# Patient Record
Sex: MALE | Race: WHITE | Employment: OTHER | URBAN - METROPOLITAN AREA
[De-identification: names, ages, dates, MRNs, and addresses within clinical notes are randomized per-mention and may not be internally consistent; named-entity substitution may affect disease eponyms.]

---

## 2019-06-15 ENCOUNTER — APPOINTMENT (OUTPATIENT)
Dept: CT IMAGING | Age: 69
DRG: 199 | End: 2019-06-15
Payer: OTHER MISCELLANEOUS

## 2019-06-15 ENCOUNTER — HOSPITAL ENCOUNTER (INPATIENT)
Age: 69
LOS: 5 days | Discharge: HOME HEALTH | DRG: 199 | End: 2019-06-20
Attending: EMERGENCY MEDICINE | Admitting: SURGERY
Payer: OTHER MISCELLANEOUS

## 2019-06-15 ENCOUNTER — APPOINTMENT (OUTPATIENT)
Dept: GENERAL RADIOLOGY | Age: 69
DRG: 199 | End: 2019-06-15
Payer: OTHER MISCELLANEOUS

## 2019-06-15 DIAGNOSIS — S22.22XA CLOSED FRACTURE OF BODY OF STERNUM, INITIAL ENCOUNTER: ICD-10-CM
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
## 2019-06-15 DIAGNOSIS — S22.5XXA CLOSED FRACTURE OF MULTIPLE RIBS WITH FLAIL CHEST, INITIAL ENCOUNTER: ICD-10-CM
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
## 2019-06-15 LAB
ABO/RH: NORMAL
ACT TEG: 105 SEC (ref 86–118)
ALLEN TEST: ABNORMAL
ANGLE, RAPID TEG: 78.9 DEG (ref 64–80)
ANION GAP SERPL CALCULATED.3IONS-SCNC: 12 MMOL/L (ref 9–17)
ANTIBODY SCREEN: NEGATIVE
ARM BAND NUMBER: NORMAL
BLOOD BANK SPECIMEN: ABNORMAL
BUN BLDV-MCNC: 18 MG/DL (ref 8–23)
CARBOXYHEMOGLOBIN: 0.9 % (ref 0–5)
CHLORIDE BLD-SCNC: 100 MMOL/L (ref 98–107)
CO2: 22 MMOL/L (ref 20–31)
CREAT SERPL-MCNC: 0.9 MG/DL (ref 0.7–1.2)
EPL-TEG: 0.6 % (ref 0–15)
ETHANOL PERCENT: <0.01 %
ETHANOL: <10 MG/DL
EXPIRATION DATE: NORMAL
FIO2: ABNORMAL
GFR AFRICAN AMERICAN: ABNORMAL ML/MIN
GFR NON-AFRICAN AMERICAN: ABNORMAL ML/MIN
GFR SERPL CREATININE-BSD FRML MDRD: ABNORMAL ML/MIN/{1.73_M2}
GFR SERPL CREATININE-BSD FRML MDRD: ABNORMAL ML/MIN/{1.73_M2}
GLUCOSE BLD-MCNC: 200 MG/DL (ref 75–110)
GLUCOSE BLD-MCNC: 236 MG/DL (ref 70–99)
HCG QUALITATIVE: NEGATIVE
HCO3 VENOUS: 24 MMOL/L (ref 24–30)
HCT VFR BLD CALC: 44.5 % (ref 40.7–50.3)
HEMOGLOBIN: 14.8 G/DL (ref 13–17)
HEPARIN THERAPY: ABNORMAL
INR BLD: 1
KINETICS RAPID TEG: 0.8 MIN (ref 1–2)
LY30 (LYSIS) TEG: 0.6 % (ref 0–8)
MA(MAX CLOT) RAPID TEG: 74.1 MM (ref 52–71)
MCH RBC QN AUTO: 29.5 PG (ref 25.2–33.5)
MCHC RBC AUTO-ENTMCNC: 33.3 G/DL (ref 28.4–34.8)
MCV RBC AUTO: 88.6 FL (ref 82.6–102.9)
METHEMOGLOBIN: ABNORMAL % (ref 0–1.5)
MODE: ABNORMAL
NEGATIVE BASE EXCESS, VEN: 1.3 MMOL/L (ref 0–2)
NOTIFICATION TIME: ABNORMAL
NOTIFICATION: ABNORMAL
NRBC AUTOMATED: 0 PER 100 WBC
O2 DEVICE/FLOW/%: ABNORMAL
O2 SAT, VEN: 68.8 % (ref 60–85)
OXYHEMOGLOBIN: ABNORMAL % (ref 95–98)
PARTIAL THROMBOPLASTIN TIME: 21.9 SEC (ref 20.5–30.5)
PATIENT TEMP: 37
PCO2, VEN, TEMP ADJ: ABNORMAL MMHG (ref 39–55)
PCO2, VEN: 44.8 (ref 39–55)
PDW BLD-RTO: 13 % (ref 11.8–14.4)
PEEP/CPAP: ABNORMAL
PH VENOUS: 7.35 (ref 7.32–7.42)
PH, VEN, TEMP ADJ: ABNORMAL (ref 7.32–7.42)
PLATELET # BLD: 374 K/UL (ref 138–453)
PMV BLD AUTO: 11.4 FL (ref 8.1–13.5)
PO2, VEN, TEMP ADJ: ABNORMAL MMHG (ref 30–50)
PO2, VEN: 39 (ref 30–50)
POSITIVE BASE EXCESS, VEN: ABNORMAL MMOL/L (ref 0–2)
POTASSIUM SERPL-SCNC: 4.3 MMOL/L (ref 3.7–5.3)
PROTHROMBIN TIME: 10.7 SEC (ref 9–12)
PSV: ABNORMAL
PT. POSITION: ABNORMAL
RBC # BLD: 5.02 M/UL (ref 4.21–5.77)
REACTION TIME RAPID TEG: 0.6 MIN (ref 0–1)
RESPIRATORY RATE: ABNORMAL
SAMPLE SITE: ABNORMAL
SET RATE: ABNORMAL
SODIUM BLD-SCNC: 134 MMOL/L (ref 135–144)
TEG COMMENT: ABNORMAL
TEXT FOR RESPIRATORY: ABNORMAL
TOTAL HB: ABNORMAL G/DL (ref 12–16)
TOTAL RATE: ABNORMAL
VT: ABNORMAL
WBC # BLD: 14.4 K/UL (ref 3.5–11.3)

## 2019-06-15 PROCEDURE — G0480 DRUG TEST DEF 1-7 CLASSES: HCPCS

## 2019-06-15 PROCEDURE — 85027 COMPLETE CBC AUTOMATED: CPT

## 2019-06-15 PROCEDURE — 85210 CLOT FACTOR II PROTHROM SPEC: CPT

## 2019-06-15 PROCEDURE — 2580000003 HC RX 258: Performed by: STUDENT IN AN ORGANIZED HEALTH CARE EDUCATION/TRAINING PROGRAM

## 2019-06-15 PROCEDURE — 82947 ASSAY GLUCOSE BLOOD QUANT: CPT

## 2019-06-15 PROCEDURE — 6360000004 HC RX CONTRAST MEDICATION: Performed by: EMERGENCY MEDICINE

## 2019-06-15 PROCEDURE — 86900 BLOOD TYPING SEROLOGIC ABO: CPT

## 2019-06-15 PROCEDURE — 2700000000 HC OXYGEN THERAPY PER DAY

## 2019-06-15 PROCEDURE — 82805 BLOOD GASES W/O2 SATURATION: CPT

## 2019-06-15 PROCEDURE — 74177 CT ABD & PELVIS W/CONTRAST: CPT

## 2019-06-15 PROCEDURE — 85390 FIBRINOLYSINS SCREEN I&R: CPT

## 2019-06-15 PROCEDURE — 84703 CHORIONIC GONADOTROPIN ASSAY: CPT

## 2019-06-15 PROCEDURE — 2000000000 HC ICU R&B

## 2019-06-15 PROCEDURE — 87641 MR-STAPH DNA AMP PROBE: CPT

## 2019-06-15 PROCEDURE — 85730 THROMBOPLASTIN TIME PARTIAL: CPT

## 2019-06-15 PROCEDURE — 6370000000 HC RX 637 (ALT 250 FOR IP): Performed by: STUDENT IN AN ORGANIZED HEALTH CARE EDUCATION/TRAINING PROGRAM

## 2019-06-15 PROCEDURE — 85610 PROTHROMBIN TIME: CPT

## 2019-06-15 PROCEDURE — 86901 BLOOD TYPING SEROLOGIC RH(D): CPT

## 2019-06-15 PROCEDURE — 71045 X-RAY EXAM CHEST 1 VIEW: CPT

## 2019-06-15 PROCEDURE — 70450 CT HEAD/BRAIN W/O DYE: CPT

## 2019-06-15 PROCEDURE — 72125 CT NECK SPINE W/O DYE: CPT

## 2019-06-15 PROCEDURE — 84520 ASSAY OF UREA NITROGEN: CPT

## 2019-06-15 PROCEDURE — 86850 RBC ANTIBODY SCREEN: CPT

## 2019-06-15 PROCEDURE — 99285 EMERGENCY DEPT VISIT HI MDM: CPT

## 2019-06-15 PROCEDURE — 72128 CT CHEST SPINE W/O DYE: CPT

## 2019-06-15 PROCEDURE — 82565 ASSAY OF CREATININE: CPT

## 2019-06-15 PROCEDURE — 72131 CT LUMBAR SPINE W/O DYE: CPT

## 2019-06-15 PROCEDURE — 80051 ELECTROLYTE PANEL: CPT

## 2019-06-15 RX ORDER — OXYCODONE HYDROCHLORIDE 5 MG/1
5 TABLET ORAL EVERY 4 HOURS PRN
Status: DISCONTINUED | OUTPATIENT
Start: 2019-06-15 | End: 2019-06-20 | Stop reason: HOSPADM

## 2019-06-15 RX ORDER — GABAPENTIN 600 MG/1
300 TABLET ORAL EVERY 8 HOURS
Status: DISCONTINUED | OUTPATIENT
Start: 2019-06-15 | End: 2019-06-19

## 2019-06-15 RX ORDER — DEXTROSE MONOHYDRATE 50 MG/ML
100 INJECTION, SOLUTION INTRAVENOUS PRN
Status: DISCONTINUED | OUTPATIENT
Start: 2019-06-15 | End: 2019-06-20 | Stop reason: HOSPADM

## 2019-06-15 RX ORDER — ONDANSETRON 2 MG/ML
4 INJECTION INTRAMUSCULAR; INTRAVENOUS EVERY 6 HOURS PRN
Status: DISCONTINUED | OUTPATIENT
Start: 2019-06-15 | End: 2019-06-20 | Stop reason: HOSPADM

## 2019-06-15 RX ORDER — NALOXONE HYDROCHLORIDE 0.4 MG/ML
0.4 INJECTION, SOLUTION INTRAMUSCULAR; INTRAVENOUS; SUBCUTANEOUS PRN
Status: DISCONTINUED | OUTPATIENT
Start: 2019-06-15 | End: 2019-06-17

## 2019-06-15 RX ORDER — CYCLOBENZAPRINE HCL 10 MG
10 TABLET ORAL EVERY 8 HOURS
Status: DISCONTINUED | OUTPATIENT
Start: 2019-06-15 | End: 2019-06-16

## 2019-06-15 RX ORDER — IBUPROFEN 400 MG/1
400 TABLET ORAL EVERY 6 HOURS
Status: DISCONTINUED | OUTPATIENT
Start: 2019-06-15 | End: 2019-06-15

## 2019-06-15 RX ORDER — DOCUSATE SODIUM 100 MG/1
100 CAPSULE, LIQUID FILLED ORAL DAILY
Status: DISCONTINUED | OUTPATIENT
Start: 2019-06-15 | End: 2019-06-15

## 2019-06-15 RX ORDER — SODIUM CHLORIDE, SODIUM LACTATE, POTASSIUM CHLORIDE, CALCIUM CHLORIDE 600; 310; 30; 20 MG/100ML; MG/100ML; MG/100ML; MG/100ML
INJECTION, SOLUTION INTRAVENOUS CONTINUOUS
Status: DISCONTINUED | OUTPATIENT
Start: 2019-06-15 | End: 2019-06-16

## 2019-06-15 RX ORDER — LIDOCAINE HYDROCHLORIDE 10 MG/ML
20 INJECTION, SOLUTION INFILTRATION; PERINEURAL ONCE
Status: COMPLETED | OUTPATIENT
Start: 2019-06-15 | End: 2019-06-16

## 2019-06-15 RX ORDER — DEXTROSE MONOHYDRATE 25 G/50ML
12.5 INJECTION, SOLUTION INTRAVENOUS PRN
Status: DISCONTINUED | OUTPATIENT
Start: 2019-06-15 | End: 2019-06-20 | Stop reason: HOSPADM

## 2019-06-15 RX ORDER — LIDOCAINE 4 G/G
2 PATCH TOPICAL DAILY
Status: DISCONTINUED | OUTPATIENT
Start: 2019-06-15 | End: 2019-06-20 | Stop reason: HOSPADM

## 2019-06-15 RX ORDER — ACETAMINOPHEN 500 MG
1000 TABLET ORAL EVERY 8 HOURS SCHEDULED
Status: DISCONTINUED | OUTPATIENT
Start: 2019-06-15 | End: 2019-06-20 | Stop reason: HOSPADM

## 2019-06-15 RX ORDER — SODIUM CHLORIDE 0.9 % (FLUSH) 0.9 %
10 SYRINGE (ML) INJECTION EVERY 12 HOURS SCHEDULED
Status: DISCONTINUED | OUTPATIENT
Start: 2019-06-15 | End: 2019-06-20 | Stop reason: HOSPADM

## 2019-06-15 RX ORDER — OXYCODONE HYDROCHLORIDE 5 MG/1
5 TABLET ORAL EVERY 6 HOURS PRN
Status: DISCONTINUED | OUTPATIENT
Start: 2019-06-15 | End: 2019-06-15

## 2019-06-15 RX ORDER — OXYCODONE HYDROCHLORIDE 5 MG/1
10 TABLET ORAL EVERY 6 HOURS PRN
Status: DISCONTINUED | OUTPATIENT
Start: 2019-06-15 | End: 2019-06-15

## 2019-06-15 RX ORDER — NICOTINE POLACRILEX 4 MG
15 LOZENGE BUCCAL PRN
Status: DISCONTINUED | OUTPATIENT
Start: 2019-06-15 | End: 2019-06-20 | Stop reason: HOSPADM

## 2019-06-15 RX ORDER — ONDANSETRON 2 MG/ML
INJECTION INTRAMUSCULAR; INTRAVENOUS
Status: DISCONTINUED
Start: 2019-06-15 | End: 2019-06-15

## 2019-06-15 RX ORDER — DOCUSATE SODIUM 100 MG/1
100 CAPSULE, LIQUID FILLED ORAL 2 TIMES DAILY
Status: DISCONTINUED | OUTPATIENT
Start: 2019-06-15 | End: 2019-06-19

## 2019-06-15 RX ORDER — IBUPROFEN 400 MG/1
400 TABLET ORAL EVERY 4 HOURS
Status: DISCONTINUED | OUTPATIENT
Start: 2019-06-15 | End: 2019-06-19

## 2019-06-15 RX ORDER — FENTANYL CITRATE 50 UG/ML
INJECTION, SOLUTION INTRAMUSCULAR; INTRAVENOUS
Status: DISCONTINUED
Start: 2019-06-15 | End: 2019-06-15

## 2019-06-15 RX ORDER — SODIUM CHLORIDE 0.9 % (FLUSH) 0.9 %
10 SYRINGE (ML) INJECTION PRN
Status: DISCONTINUED | OUTPATIENT
Start: 2019-06-15 | End: 2019-06-20 | Stop reason: HOSPADM

## 2019-06-15 RX ADMIN — CYCLOBENZAPRINE 10 MG: 10 TABLET, FILM COATED ORAL at 22:07

## 2019-06-15 RX ADMIN — DOCUSATE SODIUM 100 MG: 100 CAPSULE, LIQUID FILLED ORAL at 22:07

## 2019-06-15 RX ADMIN — OXYCODONE HYDROCHLORIDE 5 MG: 5 TABLET ORAL at 20:57

## 2019-06-15 RX ADMIN — GABAPENTIN 300 MG: 600 TABLET ORAL at 22:09

## 2019-06-15 RX ADMIN — ACETAMINOPHEN 1000 MG: 500 TABLET ORAL at 22:10

## 2019-06-15 RX ADMIN — INSULIN LISPRO 3 UNITS: 100 INJECTION, SOLUTION INTRAVENOUS; SUBCUTANEOUS at 22:20

## 2019-06-15 RX ADMIN — SODIUM CHLORIDE, POTASSIUM CHLORIDE, SODIUM LACTATE AND CALCIUM CHLORIDE: 600; 310; 30; 20 INJECTION, SOLUTION INTRAVENOUS at 21:07

## 2019-06-15 RX ADMIN — SODIUM CHLORIDE, PRESERVATIVE FREE 10 ML: 5 INJECTION INTRAVENOUS at 21:08

## 2019-06-15 RX ADMIN — IBUPROFEN 400 MG: 400 TABLET, FILM COATED ORAL at 22:07

## 2019-06-15 RX ADMIN — IOHEXOL 130 ML: 350 INJECTION, SOLUTION INTRAVENOUS at 20:11

## 2019-06-15 ASSESSMENT — PAIN SCALES - GENERAL
PAINLEVEL_OUTOF10: 3
PAINLEVEL_OUTOF10: 3
PAINLEVEL_OUTOF10: 7
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
## 2019-06-15 NOTE — ED PROVIDER NOTES
program.  Efforts were made to edit the dictations but occasionally words are mis-transcribed.)    Arnie Officer.  Nhan Harp MD, [facility]  Attending Emergency Medicine Physician        Dwight Pineda MD  06/15/19 0095

## 2019-06-16 ENCOUNTER — APPOINTMENT (OUTPATIENT)
Dept: GENERAL RADIOLOGY | Age: 69
DRG: 199 | End: 2019-06-16
Payer: OTHER MISCELLANEOUS

## 2019-06-16 ENCOUNTER — ANESTHESIA EVENT (OUTPATIENT)
Dept: GENERAL RADIOLOGY | Age: 69
DRG: 199 | End: 2019-06-16
Payer: OTHER MISCELLANEOUS

## 2019-06-16 ENCOUNTER — ANESTHESIA (OUTPATIENT)
Dept: GENERAL RADIOLOGY | Age: 69
DRG: 199 | End: 2019-06-16
Payer: OTHER MISCELLANEOUS

## 2019-06-16 LAB
ABSOLUTE EOS #: <0.03 K/UL (ref 0–0.44)
ABSOLUTE IMMATURE GRANULOCYTE: 0.13 K/UL (ref 0–0.3)
ABSOLUTE LYMPH #: 0.98 K/UL (ref 1.1–3.7)
ABSOLUTE MONO #: 1.41 K/UL (ref 0.1–1.2)
ANION GAP SERPL CALCULATED.3IONS-SCNC: 14 MMOL/L (ref 9–17)
BASOPHILS # BLD: 0 % (ref 0–2)
BASOPHILS ABSOLUTE: 0.05 K/UL (ref 0–0.2)
BUN BLDV-MCNC: 17 MG/DL (ref 8–23)
BUN/CREAT BLD: ABNORMAL (ref 9–20)
CALCIUM SERPL-MCNC: 8.7 MG/DL (ref 8.6–10.4)
CHLORIDE BLD-SCNC: 97 MMOL/L (ref 98–107)
CO2: 24 MMOL/L (ref 20–31)
CREAT SERPL-MCNC: 0.76 MG/DL (ref 0.7–1.2)
DIFFERENTIAL TYPE: ABNORMAL
EOSINOPHILS RELATIVE PERCENT: 0 % (ref 1–4)
GFR AFRICAN AMERICAN: >60 ML/MIN
GFR NON-AFRICAN AMERICAN: >60 ML/MIN
GFR SERPL CREATININE-BSD FRML MDRD: ABNORMAL ML/MIN/{1.73_M2}
GFR SERPL CREATININE-BSD FRML MDRD: ABNORMAL ML/MIN/{1.73_M2}
GLUCOSE BLD-MCNC: 178 MG/DL (ref 75–110)
GLUCOSE BLD-MCNC: 195 MG/DL (ref 75–110)
GLUCOSE BLD-MCNC: 232 MG/DL (ref 70–99)
GLUCOSE BLD-MCNC: 237 MG/DL (ref 75–110)
GLUCOSE BLD-MCNC: 272 MG/DL (ref 75–110)
HCT VFR BLD CALC: 43.4 % (ref 40.7–50.3)
HEMOGLOBIN: 14.1 G/DL (ref 13–17)
IMMATURE GRANULOCYTES: 1 %
LYMPHOCYTES # BLD: 4 % (ref 24–43)
MCH RBC QN AUTO: 28.8 PG (ref 25.2–33.5)
MCHC RBC AUTO-ENTMCNC: 32.5 G/DL (ref 28.4–34.8)
MCV RBC AUTO: 88.6 FL (ref 82.6–102.9)
MONOCYTES # BLD: 6 % (ref 3–12)
MRSA, DNA, NASAL: NORMAL
NRBC AUTOMATED: 0 PER 100 WBC
PDW BLD-RTO: 13.2 % (ref 11.8–14.4)
PLATELET # BLD: 350 K/UL (ref 138–453)
PLATELET ESTIMATE: ABNORMAL
PMV BLD AUTO: 11.5 FL (ref 8.1–13.5)
POTASSIUM SERPL-SCNC: 4 MMOL/L (ref 3.7–5.3)
RBC # BLD: 4.9 M/UL (ref 4.21–5.77)
RBC # BLD: ABNORMAL 10*6/UL
SEG NEUTROPHILS: 88 % (ref 36–65)
SEGMENTED NEUTROPHILS ABSOLUTE COUNT: 19.45 K/UL (ref 1.5–8.1)
SODIUM BLD-SCNC: 135 MMOL/L (ref 135–144)
SPECIMEN DESCRIPTION: NORMAL
TROPONIN INTERP: NORMAL
TROPONIN T: NORMAL NG/ML
TROPONIN, HIGH SENSITIVITY: 13 NG/L (ref 0–22)
WBC # BLD: 22 K/UL (ref 3.5–11.3)
WBC # BLD: ABNORMAL 10*3/UL

## 2019-06-16 PROCEDURE — 93005 ELECTROCARDIOGRAM TRACING: CPT | Performed by: STUDENT IN AN ORGANIZED HEALTH CARE EDUCATION/TRAINING PROGRAM

## 2019-06-16 PROCEDURE — 36415 COLL VENOUS BLD VENIPUNCTURE: CPT

## 2019-06-16 PROCEDURE — 84484 ASSAY OF TROPONIN QUANT: CPT

## 2019-06-16 PROCEDURE — 6370000000 HC RX 637 (ALT 250 FOR IP): Performed by: STUDENT IN AN ORGANIZED HEALTH CARE EDUCATION/TRAINING PROGRAM

## 2019-06-16 PROCEDURE — 64461 PVB THORACIC SINGLE INJ SITE: CPT | Performed by: ANESTHESIOLOGY

## 2019-06-16 PROCEDURE — 3E0T3BZ INTRODUCTION OF ANESTHETIC AGENT INTO PERIPHERAL NERVES AND PLEXI, PERCUTANEOUS APPROACH: ICD-10-PCS | Performed by: ANESTHESIOLOGY

## 2019-06-16 PROCEDURE — 2500000003 HC RX 250 WO HCPCS: Performed by: ANESTHESIOLOGY

## 2019-06-16 PROCEDURE — 6360000002 HC RX W HCPCS: Performed by: STUDENT IN AN ORGANIZED HEALTH CARE EDUCATION/TRAINING PROGRAM

## 2019-06-16 PROCEDURE — 92523 SPEECH SOUND LANG COMPREHEN: CPT

## 2019-06-16 PROCEDURE — 71045 X-RAY EXAM CHEST 1 VIEW: CPT

## 2019-06-16 PROCEDURE — 94762 N-INVAS EAR/PLS OXIMTRY CONT: CPT

## 2019-06-16 PROCEDURE — 80048 BASIC METABOLIC PNL TOTAL CA: CPT

## 2019-06-16 PROCEDURE — 85025 COMPLETE CBC W/AUTO DIFF WBC: CPT

## 2019-06-16 PROCEDURE — 2580000003 HC RX 258: Performed by: EMERGENCY MEDICINE

## 2019-06-16 PROCEDURE — 2580000003 HC RX 258

## 2019-06-16 PROCEDURE — 93005 ELECTROCARDIOGRAM TRACING: CPT

## 2019-06-16 PROCEDURE — 73070 X-RAY EXAM OF ELBOW: CPT

## 2019-06-16 PROCEDURE — 2700000000 HC OXYGEN THERAPY PER DAY

## 2019-06-16 PROCEDURE — 2500000003 HC RX 250 WO HCPCS: Performed by: STUDENT IN AN ORGANIZED HEALTH CARE EDUCATION/TRAINING PROGRAM

## 2019-06-16 PROCEDURE — 82947 ASSAY GLUCOSE BLOOD QUANT: CPT

## 2019-06-16 PROCEDURE — 2000000000 HC ICU R&B

## 2019-06-16 PROCEDURE — 73060 X-RAY EXAM OF HUMERUS: CPT

## 2019-06-16 PROCEDURE — 2580000003 HC RX 258: Performed by: STUDENT IN AN ORGANIZED HEALTH CARE EDUCATION/TRAINING PROGRAM

## 2019-06-16 RX ORDER — INSULIN GLARGINE 100 [IU]/ML
8 INJECTION, SOLUTION SUBCUTANEOUS ONCE
Status: DISCONTINUED | OUTPATIENT
Start: 2019-06-16 | End: 2019-06-16

## 2019-06-16 RX ORDER — DEXTROSE AND SODIUM CHLORIDE 5; .45 G/100ML; G/100ML
INJECTION, SOLUTION INTRAVENOUS CONTINUOUS
Status: DISCONTINUED | OUTPATIENT
Start: 2019-06-16 | End: 2019-06-16

## 2019-06-16 RX ORDER — TAMSULOSIN HYDROCHLORIDE 0.4 MG/1
0.4 CAPSULE ORAL DAILY
Status: DISCONTINUED | OUTPATIENT
Start: 2019-06-16 | End: 2019-06-20 | Stop reason: HOSPADM

## 2019-06-16 RX ORDER — MAGNESIUM HYDROXIDE 1200 MG/15ML
LIQUID ORAL
Status: COMPLETED
Start: 2019-06-16 | End: 2019-06-16

## 2019-06-16 RX ORDER — CYCLOBENZAPRINE HCL 5 MG
5 TABLET ORAL EVERY 8 HOURS
Status: DISCONTINUED | OUTPATIENT
Start: 2019-06-16 | End: 2019-06-17

## 2019-06-16 RX ADMIN — Medication 20 MCG/HR: at 02:07

## 2019-06-16 RX ADMIN — DEXTROSE AND SODIUM CHLORIDE: 5; 450 INJECTION, SOLUTION INTRAVENOUS at 07:26

## 2019-06-16 RX ADMIN — SODIUM CHLORIDE, PRESERVATIVE FREE 10 ML: 5 INJECTION INTRAVENOUS at 09:04

## 2019-06-16 RX ADMIN — LIDOCAINE HYDROCHLORIDE 20 ML: 10 INJECTION, SOLUTION INFILTRATION; PERINEURAL at 03:18

## 2019-06-16 RX ADMIN — ENOXAPARIN SODIUM 30 MG: 100 INJECTION SUBCUTANEOUS at 16:16

## 2019-06-16 RX ADMIN — IBUPROFEN 400 MG: 400 TABLET, FILM COATED ORAL at 07:28

## 2019-06-16 RX ADMIN — IBUPROFEN 400 MG: 400 TABLET, FILM COATED ORAL at 02:25

## 2019-06-16 RX ADMIN — IBUPROFEN 400 MG: 400 TABLET, FILM COATED ORAL at 09:04

## 2019-06-16 RX ADMIN — INSULIN LISPRO 2 UNITS: 100 INJECTION, SOLUTION INTRAVENOUS; SUBCUTANEOUS at 20:53

## 2019-06-16 RX ADMIN — GABAPENTIN 300 MG: 600 TABLET ORAL at 12:34

## 2019-06-16 RX ADMIN — ACETAMINOPHEN 1000 MG: 500 TABLET ORAL at 20:52

## 2019-06-16 RX ADMIN — SODIUM CHLORIDE 1000 ML: 900 IRRIGANT IRRIGATION at 03:30

## 2019-06-16 RX ADMIN — GABAPENTIN 300 MG: 600 TABLET ORAL at 07:28

## 2019-06-16 RX ADMIN — INSULIN LISPRO 3 UNITS: 100 INJECTION, SOLUTION INTRAVENOUS; SUBCUTANEOUS at 18:48

## 2019-06-16 RX ADMIN — Medication 500 ML: at 12:16

## 2019-06-16 RX ADMIN — CYCLOBENZAPRINE 5 MG: 10 TABLET, FILM COATED ORAL at 12:34

## 2019-06-16 RX ADMIN — CYCLOBENZAPRINE 10 MG: 10 TABLET, FILM COATED ORAL at 07:29

## 2019-06-16 RX ADMIN — IBUPROFEN 400 MG: 400 TABLET, FILM COATED ORAL at 12:34

## 2019-06-16 RX ADMIN — IBUPROFEN 400 MG: 400 TABLET, FILM COATED ORAL at 20:53

## 2019-06-16 RX ADMIN — DOCUSATE SODIUM 100 MG: 100 CAPSULE, LIQUID FILLED ORAL at 20:52

## 2019-06-16 RX ADMIN — ACETAMINOPHEN 1000 MG: 500 TABLET ORAL at 07:29

## 2019-06-16 RX ADMIN — INSULIN LISPRO 6 UNITS: 100 INJECTION, SOLUTION INTRAVENOUS; SUBCUTANEOUS at 13:16

## 2019-06-16 RX ADMIN — ONDANSETRON 4 MG: 2 INJECTION INTRAMUSCULAR; INTRAVENOUS at 13:02

## 2019-06-16 RX ADMIN — INSULIN LISPRO 9 UNITS: 100 INJECTION, SOLUTION INTRAVENOUS; SUBCUTANEOUS at 09:04

## 2019-06-16 ASSESSMENT — PAIN DESCRIPTION - PROGRESSION: CLINICAL_PROGRESSION: NOT CHANGED

## 2019-06-16 ASSESSMENT — PAIN DESCRIPTION - ORIENTATION: ORIENTATION: RIGHT

## 2019-06-16 ASSESSMENT — PAIN SCALES - GENERAL
PAINLEVEL_OUTOF10: 7
PAINLEVEL_OUTOF10: 0
PAINLEVEL_OUTOF10: 6
PAINLEVEL_OUTOF10: 0
PAINLEVEL_OUTOF10: 3
PAINLEVEL_OUTOF10: 7

## 2019-06-16 ASSESSMENT — PAIN DESCRIPTION - ONSET: ONSET: ON-GOING

## 2019-06-16 ASSESSMENT — PAIN DESCRIPTION - DESCRIPTORS: DESCRIPTORS: ACHING;DISCOMFORT

## 2019-06-16 ASSESSMENT — PAIN - FUNCTIONAL ASSESSMENT
PAIN_FUNCTIONAL_ASSESSMENT: 0-10
PAIN_FUNCTIONAL_ASSESSMENT: 0-10

## 2019-06-16 ASSESSMENT — PAIN DESCRIPTION - FREQUENCY: FREQUENCY: INTERMITTENT

## 2019-06-16 ASSESSMENT — PAIN DESCRIPTION - LOCATION: LOCATION: RIB CAGE

## 2019-06-16 ASSESSMENT — PAIN DESCRIPTION - PAIN TYPE: TYPE: ACUTE PAIN

## 2019-06-16 NOTE — PROGRESS NOTES
Unable to complete home med rec at this time d/t patient being unable to call certain meds and doses that he takes and d/t his pharmacy being closed on Sunday.     Pt states that he takes Metformin (unkwn dose), he is unsure of the name but thinks he takes Glimperide (unknwn dose), he thinks he takes \"25 units of 73 Novalog in AM and 25 units of regular Novalog at PM\", he says he takes a BP med but is unsure the name but says that [address] [facility] Egnar or Hydralazine sound familiar.     Pt. Goes to [facility] for meds on [address] in [place], [place]. Phone number is [phone].  Unable to contact at this time d/t the pharamacy being closed on Sunday.     Electronically signed by January Mcnamara RN on 6/16/2019 at 9:28 AM
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
## 2019-06-16 NOTE — PROGRESS NOTES
Occupational Therapy    Occupational Therapy Not Seen Note    DATE: 2019  Name: Silas Brewster  : 1950  MRN: [id number]    Patient not available for Occupational Therapy due to:    Strict Bedrest: Flat BR must be removed prior to OT eval.    Next Scheduled Treatment: Attempt on  as appropriate.     Electronically signed by Clara Avitia OT on 2019 at 3:50 PM
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
## 2019-06-16 NOTE — PLAN OF CARE
Problem: Breathing Pattern - Ineffective:  Goal: Ability to achieve and maintain a regular respiratory rate will improve  Description  Ability to achieve and maintain a regular respiratory rate will improve  6/16/2019 0520 by Tegan Storm RN  Outcome: Ongoing  6/16/2019 0519 by Tegan Storm RN  Outcome: Ongoing  6/16/2019 0129 by Tegan Storm RN  Outcome: Ongoing     Problem: Pain:  Goal: Pain level will decrease  Description  Pain level will decrease  6/16/2019 0520 by Tegan Storm RN  Outcome: Ongoing  6/16/2019 0519 by Tegan Storm RN  Outcome: Ongoing  6/16/2019 0129 by Tegan Storm RN  Outcome: Ongoing  Goal: Control of acute pain  Description  Control of acute pain  6/16/2019 0520 by Tegan Storm RN  Outcome: Ongoing  6/16/2019 0519 by Tegan Storm RN  Outcome: Ongoing  6/16/2019 0129 by Tegan Storm RN  Outcome: Ongoing  Goal: Control of chronic pain  Description  Control of chronic pain  6/16/2019 0520 by Tegan Storm RN  Outcome: Ongoing  6/16/2019 0519 by Tegan Storm RN  Outcome: Ongoing  6/16/2019 0129 by Tegan Sotrm RN  Outcome: Ongoing     Problem: Discharge Planning:  Goal: Participates in care planning  Description  Participates in care planning  6/16/2019 0520 by Tegan Storm RN  Outcome: Ongoing  6/16/2019 0519 by Tegan Storm RN  Outcome: Ongoing  6/16/2019 0129 by Tegan Storm RN  Outcome: Ongoing  Goal: Discharged to appropriate level of care  Description  Discharged to appropriate level of care  6/16/2019 0520 by Tegan Storm RN  Outcome: Ongoing  6/16/2019 0519 by Tegan Storm RN  Outcome: Ongoing  6/16/2019 0129 by Tegan Storm RN  Outcome: Ongoing     Problem: Airway Clearance - Ineffective:  Goal: Ability to maintain a clear airway will improve  Description  Ability to maintain a clear airway will improve  6/16/2019 0520 by Tegan Storm RN  Outcome: Ongoing  6/16/2019 0519 by Tegan Storm RN  Outcome: Ongoing  6/16/2019 0129 by Raiza Fernandez RN  Outcome: Ongoing     Problem: Anxiety/Stress:  Goal: Level of anxiety will decrease  Description  Level of anxiety will decrease  6/16/2019 0520 by Raiza Fernandez RN  Outcome: Ongoing  6/16/2019 0519 by Raiza Fernandez RN  Outcome: Ongoing  6/16/2019 0129 by Raiza Fernandez RN  Outcome: Ongoing     Problem: Serum Glucose Level - Abnormal:  Goal: Ability to maintain appropriate glucose levels will improve to within specified parameters  Description  Ability to maintain appropriate glucose levels will improve to within specified parameters  6/16/2019 0520 by Raiza Fernandez RN  Outcome: Ongoing  6/16/2019 0519 by Raiza Fernandez RN  Outcome: Ongoing  6/16/2019 0129 by Raiza Fernandez RN  Outcome: Ongoing     Problem: Skin Integrity - Impaired:  Goal: Will show no infection signs and symptoms  Description  Will show no infection signs and symptoms  6/16/2019 0520 by Raiza Fernandez RN  Outcome: Ongoing  6/16/2019 0519 by Raiza Fernandez RN  Outcome: Ongoing  6/16/2019 0129 by Raiza Fernandez RN  Outcome: Ongoing  Goal: Absence of new skin breakdown  Description  Absence of new skin breakdown  6/16/2019 0520 by aRiza Fernandez RN  Outcome: Ongoing  6/16/2019 0519 by Raiza Fernandez RN  Outcome: Ongoing  6/16/2019 0129 by Raiza Fernandez RN  Outcome: Ongoing     Problem: Sleep Pattern Disturbance:  Goal: Appears well-rested  Description  Appears well-rested  6/16/2019 0520 by Raiza Fernandez RN  Outcome: Ongoing  6/16/2019 0519 by Raiza Fernandez RN  Outcome: Ongoing  6/16/2019 0129 by Raiza Fernandez RN  Outcome: Ongoing     Problem: Falls - Risk of:  Goal: Will remain free from falls  Description  Will remain free from falls  6/16/2019 0520 by Raiza Fernandez RN  Outcome: Ongoing  6/16/2019 0519 by Raiza Fernandez RN  Outcome: Ongoing  Goal: Absence of physical injury  Description  Absence of physical injury  6/16/2019 0520 by Raiza Fernandez RN  Outcome: Ongoing  6/16/2019 0519 by Tutu Delgado RN  Outcome: Ongoing
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
## 2019-06-16 NOTE — ANESTHESIA PROCEDURE NOTES
Peripheral Block    Patient location during procedure: pre-op  Start time: 6/16/2019 10:38 AM  End time: 6/16/2019 11:08 AM  Staffing  Anesthesiologist: Shen Grant MD  Performed: anesthesiologist   Preanesthetic Checklist  Completed: patient identified, site marked, surgical consent, pre-op evaluation, timeout performed, IV checked, risks and benefits discussed, monitors and equipment checked, anesthesia consent given, oxygen available and patient being monitored  Peripheral Block  Patient position: sitting  Prep: ChloraPrep  Patient monitoring: cardiac monitor, continuous pulse ox, frequent blood pressure checks and IV access  Block type: Thoracic paravertebral  Laterality: right  Injection technique: catheter  Procedures: nerve stimulator  Local infiltration: lidocaine  Infiltration strength: 1 %  Dose: 3 mL  Provider prep: mask and sterile gloves  Local infiltration: lidocaine  1 level  Needle  Needle type: Tuohy   Needle gauge: 18 G  Needle length: 10 cm  Needle localization: anatomical landmarks  Needle insertion depth: 7 cm  Catheter size: 20 G  Catheter at skin depth: 10 cm  Test dose: negative  Assessment  Injection assessment: negative aspiration for heme, no paresthesia on injection and local visualized surrounding nerve on ultrasound  Paresthesia pain: none  Slow fractionated injection: yes  Hemodynamics: stable  Additional Notes  Timeout performed with [facility] ICU  Total 25 ml 0.5% Bupivacaine injected             Reason for block: post-op pain management and at surgeon's request

## 2019-06-16 NOTE — PLAN OF CARE
Problem: Pain:  Goal: Pain level will decrease  Description  Pain level will decrease  6/16/2019 1705 by Estela Ball RN  Outcome: Ongoing     Problem: Discharge Planning:  Goal: Participates in care planning  Description  Participates in care planning  6/16/2019 1705 by Estela Ball RN  Outcome: Ongoing   Electronically signed by Estela Ball RN on 6/16/2019 at 5:05 PM

## 2019-06-16 NOTE — PLAN OF CARE
Problem: Breathing Pattern - Ineffective:  Goal: Ability to achieve and maintain a regular respiratory rate will improve  Description  Ability to achieve and maintain a regular respiratory rate will improve  6/16/2019 0519 by Dora Solitario RN  Outcome: Ongoing  6/16/2019 0129 by Dora Solitario RN  Outcome: Ongoing     Problem: Pain:  Goal: Pain level will decrease  Description  Pain level will decrease  6/16/2019 0519 by Dora Solitario RN  Outcome: Ongoing  6/16/2019 0129 by Dora Solitario RN  Outcome: Ongoing  Goal: Control of acute pain  Description  Control of acute pain  6/16/2019 0519 by Dora Solitario RN  Outcome: Ongoing  6/16/2019 0129 by Dora Solitario RN  Outcome: Ongoing  Goal: Control of chronic pain  Description  Control of chronic pain  6/16/2019 0519 by Dora Solitario RN  Outcome: Ongoing  6/16/2019 0129 by Dora Solitario RN  Outcome: Ongoing     Problem: Discharge Planning:  Goal: Participates in care planning  Description  Participates in care planning  6/16/2019 0519 by Dora Solitario RN  Outcome: Ongoing  6/16/2019 0129 by Dora Solitario RN  Outcome: Ongoing  Goal: Discharged to appropriate level of care  Description  Discharged to appropriate level of care  6/16/2019 0519 by Dora Solitario RN  Outcome: Ongoing  6/16/2019 0129 by Dora Solitario RN  Outcome: Ongoing     Problem: Airway Clearance - Ineffective:  Goal: Ability to maintain a clear airway will improve  Description  Ability to maintain a clear airway will improve  6/16/2019 0519 by Dora Solitario RN  Outcome: Ongoing  6/16/2019 0129 by Dora Solitario RN  Outcome: Ongoing     Problem: Anxiety/Stress:  Goal: Level of anxiety will decrease  Description  Level of anxiety will decrease  6/16/2019 0519 by Dora Solitario RN  Outcome: Ongoing  6/16/2019 0129 by Dora Solitario RN  Outcome: Ongoing     Problem: Serum Glucose Level - Abnormal:  Goal: Ability to maintain appropriate glucose levels will improve to

## 2019-06-16 NOTE — PLAN OF CARE
PROVIDE ADEQUATE OXYGENATION WITH ACCEPTABLE SP02/ABG'S    ? IDENTIFY APPROPRIATE OXYGEN THERAPY  ? MONITOR SP02/ABG'S AS NEEDED   ?    PATIENT EDUCATION AS NEEDED

## 2019-06-17 ENCOUNTER — APPOINTMENT (OUTPATIENT)
Dept: GENERAL RADIOLOGY | Age: 69
DRG: 199 | End: 2019-06-17
Payer: OTHER MISCELLANEOUS

## 2019-06-17 PROBLEM — E11.9 TYPE 2 DIABETES MELLITUS, WITH LONG-TERM CURRENT USE OF INSULIN (HCC): Status: ACTIVE | Noted: 2019-06-17

## 2019-06-17 PROBLEM — Z79.4 TYPE 2 DIABETES MELLITUS, WITH LONG-TERM CURRENT USE OF INSULIN (HCC): Status: ACTIVE | Noted: 2019-06-17

## 2019-06-17 PROBLEM — S05.11XA PERIORBITAL CONTUSION OF RIGHT EYE: Status: ACTIVE | Noted: 2019-06-17

## 2019-06-17 PROBLEM — S22.41XA CLOSED FRACTURE OF MULTIPLE RIBS OF RIGHT SIDE: Status: ACTIVE | Noted: 2019-06-17

## 2019-06-17 PROBLEM — S50.312A ABRASION OF LEFT ELBOW: Status: ACTIVE | Noted: 2019-06-17

## 2019-06-17 PROBLEM — S22.22XA CLOSED FRACTURE OF BODY OF STERNUM: Status: ACTIVE | Noted: 2019-06-17

## 2019-06-17 PROBLEM — R79.89 PRERENAL AZOTEMIA: Status: ACTIVE | Noted: 2019-06-17

## 2019-06-17 PROBLEM — S01.81XA LACERATION OF PERIORBITAL AREA: Status: ACTIVE | Noted: 2019-06-17

## 2019-06-17 PROBLEM — D72.829 LEUKOCYTOSIS: Status: ACTIVE | Noted: 2019-06-17

## 2019-06-17 LAB
ABSOLUTE EOS #: 0.17 K/UL (ref 0–0.4)
ABSOLUTE IMMATURE GRANULOCYTE: 0 K/UL (ref 0–0.3)
ABSOLUTE LYMPH #: 2.75 K/UL (ref 1–4.8)
ABSOLUTE MONO #: 1.38 K/UL (ref 0.1–0.8)
ANION GAP SERPL CALCULATED.3IONS-SCNC: 12 MMOL/L (ref 9–17)
BASOPHILS # BLD: 0 % (ref 0–2)
BASOPHILS ABSOLUTE: 0 K/UL (ref 0–0.2)
BUN BLDV-MCNC: 25 MG/DL (ref 8–23)
BUN/CREAT BLD: ABNORMAL (ref 9–20)
CALCIUM SERPL-MCNC: 8.6 MG/DL (ref 8.6–10.4)
CHLORIDE BLD-SCNC: 101 MMOL/L (ref 98–107)
CO2: 22 MMOL/L (ref 20–31)
CREAT SERPL-MCNC: 0.99 MG/DL (ref 0.7–1.2)
DIFFERENTIAL TYPE: ABNORMAL
EKG ATRIAL RATE: 73 BPM
EKG P AXIS: 31 DEGREES
EKG P-R INTERVAL: 162 MS
EKG Q-T INTERVAL: 390 MS
EKG QRS DURATION: 82 MS
EKG QTC CALCULATION (BAZETT): 429 MS
EKG R AXIS: -46 DEGREES
EKG T AXIS: 91 DEGREES
EKG VENTRICULAR RATE: 73 BPM
EOSINOPHILS RELATIVE PERCENT: 1 % (ref 1–4)
GFR AFRICAN AMERICAN: >60 ML/MIN
GFR NON-AFRICAN AMERICAN: >60 ML/MIN
GFR SERPL CREATININE-BSD FRML MDRD: ABNORMAL ML/MIN/{1.73_M2}
GFR SERPL CREATININE-BSD FRML MDRD: ABNORMAL ML/MIN/{1.73_M2}
GLUCOSE BLD-MCNC: 186 MG/DL (ref 75–110)
GLUCOSE BLD-MCNC: 219 MG/DL (ref 75–110)
GLUCOSE BLD-MCNC: 221 MG/DL (ref 75–110)
GLUCOSE BLD-MCNC: 231 MG/DL (ref 70–99)
GLUCOSE BLD-MCNC: 329 MG/DL (ref 75–110)
HCT VFR BLD CALC: 42 % (ref 40.7–50.3)
HEMOGLOBIN: 13.2 G/DL (ref 13–17)
IMMATURE GRANULOCYTES: 0 %
LYMPHOCYTES # BLD: 16 % (ref 24–44)
MCH RBC QN AUTO: 28.7 PG (ref 25.2–33.5)
MCHC RBC AUTO-ENTMCNC: 31.4 G/DL (ref 28.4–34.8)
MCV RBC AUTO: 91.3 FL (ref 82.6–102.9)
MONOCYTES # BLD: 8 % (ref 1–7)
MORPHOLOGY: NORMAL
NRBC AUTOMATED: 0 PER 100 WBC
PDW BLD-RTO: 13.4 % (ref 11.8–14.4)
PLATELET # BLD: 297 K/UL (ref 138–453)
PLATELET ESTIMATE: ABNORMAL
PMV BLD AUTO: 12.2 FL (ref 8.1–13.5)
POTASSIUM SERPL-SCNC: 3.9 MMOL/L (ref 3.7–5.3)
RBC # BLD: 4.6 M/UL (ref 4.21–5.77)
RBC # BLD: ABNORMAL 10*6/UL
SEG NEUTROPHILS: 75 % (ref 36–66)
SEGMENTED NEUTROPHILS ABSOLUTE COUNT: 12.9 K/UL (ref 1.8–7.7)
SODIUM BLD-SCNC: 135 MMOL/L (ref 135–144)
WBC # BLD: 17.2 K/UL (ref 3.5–11.3)
WBC # BLD: ABNORMAL 10*3/UL

## 2019-06-17 PROCEDURE — 93010 ELECTROCARDIOGRAM REPORT: CPT | Performed by: INTERNAL MEDICINE

## 2019-06-17 PROCEDURE — 85025 COMPLETE CBC W/AUTO DIFF WBC: CPT

## 2019-06-17 PROCEDURE — 94762 N-INVAS EAR/PLS OXIMTRY CONT: CPT

## 2019-06-17 PROCEDURE — 6360000002 HC RX W HCPCS: Performed by: STUDENT IN AN ORGANIZED HEALTH CARE EDUCATION/TRAINING PROGRAM

## 2019-06-17 PROCEDURE — 6370000000 HC RX 637 (ALT 250 FOR IP): Performed by: STUDENT IN AN ORGANIZED HEALTH CARE EDUCATION/TRAINING PROGRAM

## 2019-06-17 PROCEDURE — 82947 ASSAY GLUCOSE BLOOD QUANT: CPT

## 2019-06-17 PROCEDURE — 36415 COLL VENOUS BLD VENIPUNCTURE: CPT

## 2019-06-17 PROCEDURE — 2580000003 HC RX 258: Performed by: STUDENT IN AN ORGANIZED HEALTH CARE EDUCATION/TRAINING PROGRAM

## 2019-06-17 PROCEDURE — 2060000000 HC ICU INTERMEDIATE R&B

## 2019-06-17 PROCEDURE — 97162 PT EVAL MOD COMPLEX 30 MIN: CPT

## 2019-06-17 PROCEDURE — 97166 OT EVAL MOD COMPLEX 45 MIN: CPT

## 2019-06-17 PROCEDURE — 97127 HC SP THER IVNTJ W/FOCUS COG FUNCJ: CPT

## 2019-06-17 PROCEDURE — 2700000000 HC OXYGEN THERAPY PER DAY

## 2019-06-17 PROCEDURE — 83036 HEMOGLOBIN GLYCOSYLATED A1C: CPT

## 2019-06-17 PROCEDURE — 71045 X-RAY EXAM CHEST 1 VIEW: CPT

## 2019-06-17 PROCEDURE — 97530 THERAPEUTIC ACTIVITIES: CPT

## 2019-06-17 PROCEDURE — 80048 BASIC METABOLIC PNL TOTAL CA: CPT

## 2019-06-17 PROCEDURE — 94761 N-INVAS EAR/PLS OXIMETRY MLT: CPT

## 2019-06-17 PROCEDURE — 97535 SELF CARE MNGMENT TRAINING: CPT

## 2019-06-17 RX ORDER — CYCLOBENZAPRINE HCL 5 MG
5 TABLET ORAL EVERY 8 HOURS
Status: DISCONTINUED | OUTPATIENT
Start: 2019-06-17 | End: 2019-06-20 | Stop reason: HOSPADM

## 2019-06-17 RX ORDER — AMLODIPINE BESYLATE 10 MG/1
10 TABLET ORAL DAILY
Status: DISCONTINUED | OUTPATIENT
Start: 2019-06-17 | End: 2019-06-20 | Stop reason: HOSPADM

## 2019-06-17 RX ORDER — GLIMEPIRIDE 4 MG/1
4 TABLET ORAL 2 TIMES DAILY
COMMUNITY

## 2019-06-17 RX ORDER — AMLODIPINE BESYLATE 10 MG/1
10 TABLET ORAL DAILY
COMMUNITY

## 2019-06-17 RX ORDER — HYDROCHLOROTHIAZIDE 25 MG/1
25 TABLET ORAL DAILY
COMMUNITY

## 2019-06-17 RX ORDER — HYDROCHLOROTHIAZIDE 25 MG/1
25 TABLET ORAL DAILY
Status: DISCONTINUED | OUTPATIENT
Start: 2019-06-17 | End: 2019-06-20 | Stop reason: HOSPADM

## 2019-06-17 RX ORDER — INSULIN GLARGINE 100 [IU]/ML
10 INJECTION, SOLUTION SUBCUTANEOUS ONCE
Status: COMPLETED | OUTPATIENT
Start: 2019-06-17 | End: 2019-06-17

## 2019-06-17 RX ORDER — GLIMEPIRIDE 2 MG/1
4 TABLET ORAL 2 TIMES DAILY
Status: DISCONTINUED | OUTPATIENT
Start: 2019-06-17 | End: 2019-06-20 | Stop reason: HOSPADM

## 2019-06-17 RX ADMIN — AMLODIPINE BESYLATE 10 MG: 10 TABLET ORAL at 17:29

## 2019-06-17 RX ADMIN — INSULIN LISPRO 6 UNITS: 100 INJECTION, SOLUTION INTRAVENOUS; SUBCUTANEOUS at 08:21

## 2019-06-17 RX ADMIN — SODIUM CHLORIDE, PRESERVATIVE FREE 10 ML: 5 INJECTION INTRAVENOUS at 12:10

## 2019-06-17 RX ADMIN — GLIMEPIRIDE 4 MG: 2 TABLET ORAL at 21:15

## 2019-06-17 RX ADMIN — DOCUSATE SODIUM 100 MG: 100 CAPSULE, LIQUID FILLED ORAL at 21:14

## 2019-06-17 RX ADMIN — DOCUSATE SODIUM 100 MG: 100 CAPSULE, LIQUID FILLED ORAL at 08:18

## 2019-06-17 RX ADMIN — ACETAMINOPHEN 1000 MG: 500 TABLET ORAL at 14:22

## 2019-06-17 RX ADMIN — IBUPROFEN 400 MG: 400 TABLET, FILM COATED ORAL at 21:14

## 2019-06-17 RX ADMIN — OXYCODONE HYDROCHLORIDE 5 MG: 5 TABLET ORAL at 08:29

## 2019-06-17 RX ADMIN — IBUPROFEN 400 MG: 400 TABLET, FILM COATED ORAL at 14:22

## 2019-06-17 RX ADMIN — TAMSULOSIN HYDROCHLORIDE 0.4 MG: 0.4 CAPSULE ORAL at 08:18

## 2019-06-17 RX ADMIN — CYCLOBENZAPRINE 5 MG: 10 TABLET, FILM COATED ORAL at 05:08

## 2019-06-17 RX ADMIN — GABAPENTIN 300 MG: 600 TABLET ORAL at 05:10

## 2019-06-17 RX ADMIN — SODIUM CHLORIDE, PRESERVATIVE FREE 10 ML: 5 INJECTION INTRAVENOUS at 21:17

## 2019-06-17 RX ADMIN — INSULIN LISPRO 6 UNITS: 100 INJECTION, SOLUTION INTRAVENOUS; SUBCUTANEOUS at 12:27

## 2019-06-17 RX ADMIN — IBUPROFEN 400 MG: 400 TABLET, FILM COATED ORAL at 08:18

## 2019-06-17 RX ADMIN — GABAPENTIN 300 MG: 600 TABLET ORAL at 21:15

## 2019-06-17 RX ADMIN — CYCLOBENZAPRINE HYDROCHLORIDE 5 MG: 5 TABLET, FILM COATED ORAL at 08:05

## 2019-06-17 RX ADMIN — IBUPROFEN 400 MG: 400 TABLET, FILM COATED ORAL at 05:10

## 2019-06-17 RX ADMIN — INSULIN GLARGINE 10 UNITS: 100 INJECTION, SOLUTION SUBCUTANEOUS at 09:14

## 2019-06-17 RX ADMIN — ENOXAPARIN SODIUM 30 MG: 100 INJECTION SUBCUTANEOUS at 08:18

## 2019-06-17 RX ADMIN — HYDROCHLOROTHIAZIDE 25 MG: 25 TABLET ORAL at 17:29

## 2019-06-17 RX ADMIN — IBUPROFEN 400 MG: 400 TABLET, FILM COATED ORAL at 01:12

## 2019-06-17 RX ADMIN — ENOXAPARIN SODIUM 30 MG: 100 INJECTION SUBCUTANEOUS at 21:16

## 2019-06-17 RX ADMIN — GABAPENTIN 300 MG: 600 TABLET ORAL at 14:21

## 2019-06-17 RX ADMIN — OXYCODONE HYDROCHLORIDE 5 MG: 5 TABLET ORAL at 12:28

## 2019-06-17 RX ADMIN — IBUPROFEN 400 MG: 400 TABLET, FILM COATED ORAL at 16:50

## 2019-06-17 RX ADMIN — INSULIN LISPRO 25 UNITS: 100 INJECTION, SUSPENSION SUBCUTANEOUS at 21:18

## 2019-06-17 RX ADMIN — OXYCODONE HYDROCHLORIDE 5 MG: 5 TABLET ORAL at 01:11

## 2019-06-17 RX ADMIN — OXYCODONE HYDROCHLORIDE 5 MG: 5 TABLET ORAL at 16:51

## 2019-06-17 RX ADMIN — ACETAMINOPHEN 1000 MG: 500 TABLET ORAL at 05:07

## 2019-06-17 RX ADMIN — OXYCODONE HYDROCHLORIDE 5 MG: 5 TABLET ORAL at 21:12

## 2019-06-17 RX ADMIN — CYCLOBENZAPRINE HYDROCHLORIDE 5 MG: 5 TABLET, FILM COATED ORAL at 16:50

## 2019-06-17 RX ADMIN — ACETAMINOPHEN 1000 MG: 500 TABLET ORAL at 21:13

## 2019-06-17 SDOH — HEALTH STABILITY: MENTAL HEALTH: HOW OFTEN DO YOU HAVE A DRINK CONTAINING ALCOHOL?: NEVER

## 2019-06-17 ASSESSMENT — PAIN - FUNCTIONAL ASSESSMENT
PAIN_FUNCTIONAL_ASSESSMENT: PREVENTS OR INTERFERES SOME ACTIVE ACTIVITIES AND ADLS
PAIN_FUNCTIONAL_ASSESSMENT: PREVENTS OR INTERFERES SOME ACTIVE ACTIVITIES AND ADLS

## 2019-06-17 ASSESSMENT — PAIN SCALES - GENERAL
PAINLEVEL_OUTOF10: 5
PAINLEVEL_OUTOF10: 9
PAINLEVEL_OUTOF10: 10
PAINLEVEL_OUTOF10: 2
PAINLEVEL_OUTOF10: 8
PAINLEVEL_OUTOF10: 9
PAINLEVEL_OUTOF10: 8
PAINLEVEL_OUTOF10: 3
PAINLEVEL_OUTOF10: 5
PAINLEVEL_OUTOF10: 8
PAINLEVEL_OUTOF10: 8
PAINLEVEL_OUTOF10: 3
PAINLEVEL_OUTOF10: 8

## 2019-06-17 ASSESSMENT — PAIN DESCRIPTION - ONSET
ONSET: ON-GOING
ONSET: ON-GOING

## 2019-06-17 ASSESSMENT — PAIN DESCRIPTION - PAIN TYPE
TYPE: ACUTE PAIN

## 2019-06-17 ASSESSMENT — PAIN DESCRIPTION - PROGRESSION
CLINICAL_PROGRESSION: NOT CHANGED
CLINICAL_PROGRESSION: GRADUALLY IMPROVING

## 2019-06-17 ASSESSMENT — PAIN DESCRIPTION - DESCRIPTORS
DESCRIPTORS: CONSTANT;PRESSURE
DESCRIPTORS: CONSTANT;PRESSURE

## 2019-06-17 ASSESSMENT — PAIN DESCRIPTION - LOCATION
LOCATION: RIB CAGE;STERNUM
LOCATION: RIB CAGE
LOCATION: RIB CAGE;STERNUM
LOCATION: RIB CAGE

## 2019-06-17 ASSESSMENT — PAIN DESCRIPTION - FREQUENCY
FREQUENCY: INTERMITTENT
FREQUENCY: INTERMITTENT

## 2019-06-17 ASSESSMENT — PAIN SCALES - WONG BAKER: WONGBAKER_NUMERICALRESPONSE: 10

## 2019-06-17 ASSESSMENT — PAIN DESCRIPTION - ORIENTATION
ORIENTATION: RIGHT
ORIENTATION: RIGHT
ORIENTATION: RIGHT;ANTERIOR

## 2019-06-17 NOTE — PROGRESS NOTES
services this date. patient cooperative throughout session. Pain Assessment  Pain Assessment: Faces  Pain Level: 3  Molina-Baker Pain Rating: Hurts worst  Pain Type: Acute pain  Pain Location: Rib cage  Pain Orientation: Right  Non-Pharmaceutical Pain Intervention(s): Distraction; Therapeutic presence  Response to Pain Intervention: Patient Satisfied  Social/Functional History  Social/Functional History  Lives With: Alone  Type of Home: House  Home Layout: One level  Home Access: Stairs to enter with rails  Entrance Stairs - Number of Steps: 2  Entrance Stairs - Rails: Left  Bathroom Shower/Tub: Walk-in shower, Shower chair with back  Bathroom Toilet: Standard(has a raiser at home)  Bathroom Equipment: Grab bars in shower  Bathroom Accessibility: Accessible  Home Equipment: Rolling walker, Cane  Receives Help From: Family  ADL Assistance: Independent  Homemaking Assistance: Independent  Homemaking Responsibilities: Yes  Meal Prep Responsibility: Primary  Laundry Responsibility: Primary  Cleaning Responsibility: Primary  Ambulation Assistance: Independent  Transfer Assistance: Independent  Active : Yes  Mode of Transportation: [facility]  Occupation: Full time employment  Type of occupation: pharmacy delivering medications  IADL Comments: Patient completes all IADL tasks, laundry is located on main floor of home  Additional Comments: Sister and Brother in law lives nearby if assistance would be needed       Objective   Vision: Within Functional Limits  Hearing: Within functional limits    Orientation  Overall Orientation Status: Within Normal Limits     Balance  Sitting Balance: Modified independent   Standing Balance: Contact guard assistance  Standing Balance  Time: ~1 minutes  Activity: static standing at bed, 3 steps to bedside chair  Comment: limited due to connection to high flow O2  Functional Mobility  Functional - Mobility Device: No device  Activity: Other  Assist Level: Contact guard assistance  ADL  Feeding: Modified independent   Grooming: Contact guard assistance  UE Bathing: Minimal assistance  LE Bathing: Moderate assistance  UE Dressing: Minimal assistance  LE Dressing: Moderate assistance  Toileting: Moderate assistance  Additional Comments: Patient completed bed mobility at [facility] due to increased pain in R side ribs. Patient unable to don socks at this time due to pain and did not attempt. Patient sat EOB for ~4 minutes with no LOB noted or SOB. Patient completed UB dressing at [address] [place] [address], educated on dressing the R side first with good return. Patient completed functional mobility from bedside to chair due to decreased ability to walk distance with high flow O2 connection.     Tone RUE  RUE Tone: Normotonic  Tone LUE  LUE Tone: Normotonic  Coordination  Movements Are Fluid And Coordinated: Yes     Bed mobility  Supine to Sit: Minimal assistance  Scooting: Contact guard assistance  Transfers  Sit to stand: Contact guard assistance  Stand to sit: Contact guard assistance     Cognition  Overall Cognitive Status: WNL        Sensation  Overall Sensation Status: WFL        LUE AROM : WFL  Left Hand AROM: WFL  RUE AROM : WFL  Right Hand AROM: WFL  LUE Strength  L Hand General: 4/5  RUE Strength  R Hand General: 4+/5              Plan   Plan  Times per week: 3-4x/wk  Current Treatment Recommendations: Strengthening, Endurance Training, Patient/Caregiver Education & Training, Self-Care / ADL, Home Management Training, Safety Education & Training    AM-PAC Score        AM-[facility] Inpatient Daily Activity Raw Score: 18 (06/17/19 1006)  AM-PAC Inpatient ADL T-Scale Score : 38.66 (06/17/19 1006)  ADL Inpatient CMS 0-100% Score: 46.65 (06/17/19 1006)  ADL Inpatient CMS G-Code Modifier : CK (06/17/19 1006)    Goals  Short term goals  Time Frame for Short term goals: Patient will, by discharge  Short term goal 1: demonstrate UB self-cares at Mod I with AE PRN  Short term goal 2: demonstrate LB self-cares at Mod I with AE PRN  Short term goal 3: demonstrate tolieting at Mod I with good safety awareness  Short term goal 4: demonstrate ~20 minutes of functional activity tolerance with LRD  Short term goal 5: demonstrate grooming tasks at SBA standing sinkside with LRD        Therapy Time   Individual Concurrent Group Co-treatment   Time In 0825         Time Out 0845         Minutes 20         Timed Code Treatment Minutes: [address] Malia Tinoco, OTR/L
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
## 2019-06-17 NOTE — PLAN OF CARE
Problem: Breathing Pattern - Ineffective:  Goal: Ability to achieve and maintain a regular respiratory rate will improve  Description  Ability to achieve and maintain a regular respiratory rate will improve  Outcome: Ongoing     Problem: Pain:  Goal: Pain level will decrease  Description  Pain level will decrease  Outcome: Ongoing  Goal: Control of acute pain  Description  Control of acute pain  Outcome: Ongoing  Goal: Control of chronic pain  Description  Control of chronic pain  Outcome: Ongoing     Problem: Discharge Planning:  Goal: Participates in care planning  Description  Participates in care planning  Outcome: Ongoing  Goal: Discharged to appropriate level of care  Description  Discharged to appropriate level of care  Outcome: Ongoing     Problem: Airway Clearance - Ineffective:  Goal: Ability to maintain a clear airway will improve  Description  Ability to maintain a clear airway will improve  Outcome: Ongoing     Problem: Anxiety/Stress:  Goal: Level of anxiety will decrease  Description  Level of anxiety will decrease  Outcome: Ongoing     Problem: Serum Glucose Level - Abnormal:  Goal: Ability to maintain appropriate glucose levels will improve to within specified parameters  Description  Ability to maintain appropriate glucose levels will improve to within specified parameters  Outcome: Ongoing     Problem: Skin Integrity - Impaired:  Goal: Will show no infection signs and symptoms  Description  Will show no infection signs and symptoms  Outcome: Ongoing  Goal: Absence of new skin breakdown  Description  Absence of new skin breakdown  Outcome: Ongoing     Problem: Sleep Pattern Disturbance:  Goal: Appears well-rested  Description  Appears well-rested  Outcome: Ongoing     Problem: Falls - Risk of:  Goal: Will remain free from falls  Description  Will remain free from falls  Outcome: Ongoing  Goal: Absence of physical injury  Description  Absence of physical injury  Outcome: Ongoing     Problem: Risk
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
## 2019-06-17 NOTE — PLAN OF CARE
Problem: Breathing Pattern - Ineffective:  Goal: Ability to achieve and maintain a regular respiratory rate will improve  Description  Ability to achieve and maintain a regular respiratory rate will improve  Outcome: Ongoing     Problem: Pain:  Goal: Pain level will decrease  Description  Pain level will decrease  6/16/2019 2300 by Manan Michael RN  Outcome: Ongoing  6/16/2019 1705 by Natasha Mckenna RN  Outcome: Ongoing  Goal: Control of acute pain  Description  Control of acute pain  Outcome: Ongoing  Goal: Control of chronic pain  Description  Control of chronic pain  Outcome: Ongoing     Problem: Discharge Planning:  Goal: Participates in care planning  Description  Participates in care planning  6/16/2019 1705 by Natasha Mckenna RN  Outcome: Ongoing     Problem: Airway Clearance - Ineffective:  Goal: Ability to maintain a clear airway will improve  Description  Ability to maintain a clear airway will improve  Outcome: Ongoing     Problem: Anxiety/Stress:  Goal: Level of anxiety will decrease  Description  Level of anxiety will decrease  Outcome: Ongoing     Problem: Skin Integrity - Impaired:  Goal: Will show no infection signs and symptoms  Description  Will show no infection signs and symptoms  Outcome: Ongoing  Goal: Absence of new skin breakdown  Description  Absence of new skin breakdown  Outcome: Ongoing  Note:   Skin assessment complete. No new lisa of skin breakdown noted. Pt turned and repositioned q2  . Pressure relief mattress in place. Linens remain as clean and dry as possible.  Will continue to assess skin and intervene as necessary      Problem: Sleep Pattern Disturbance:  Goal: Appears well-rested  Description  Appears well-rested  Outcome: Ongoing     Problem: Falls - Risk of:  Goal: Will remain free from falls  Description  Will remain free from falls  Outcome: Ongoing  Note:   Room free of clutter, slip resistant socks on, call light within reach, bed locked and in lowest position, bed rails x2. Pt agreed to use call light and wait for assistance before getting up. Pt calls out appropriately.

## 2019-06-17 NOTE — FLOWSHEET NOTE
6 prn doses given from marcaine and 17 attempts  continous dose is 6 ml and not 10 mls as order states
At 12:30 Nurse called Primary phone and said family and patient could use spiritual care. I responded; however, pt and family inside room refused spiritual care. Son of  Pt took me to pt granddaughter in main lobby. She was grieving. She didn't want to talk but was open to prayer. I offered a prayer to the granddaughter. 06/16/19 1245   Encounter Summary   Services provided to: Family   Referral/Consult From: Nurse   Support System Children   Continue Visiting   (6/16/19)   Complexity of Encounter Low   Length of Encounter 15 minutes   Spiritual Assessment Completed Yes   Routine   Type Follow up   Assessment Tearful;Grieving; Anxious; Fearful; Hopeless;Spiritual struggle; Helplessness; Sad   Intervention Active listening;Prayer;Sustaining presence/ Ministry of presence   Outcome Expressed gratitude; Refused/declined;Tearful;Grieving
Bupivacaine0.125%in NSinfusing into rt upper shoulder back area at 6ml/hr withpt able to bolus 3ml every 30 min if needed.   56.85  Infused   Pt attempted 5 times since am and received 3 doses   Pt transferred to 420
Dr. Jose Ventura @ bedside around 0400 to irrigate R eyelid lac with sterile water and approximated the edges with surgical glue - Pt's R eyelid lac stopped bleeding almost immediately. Dr. Jose Ventura also took a look at the pt's L rear upper arm puncture wound and covered it with a vaseline gauze, bacitracin ointment, and dry 4x4 dressing.
M arcaine pump numbers are  Volume 311.4 infused   47.9 ml   No attempts made or given from  Prn dose of 3 ml
Chaplain Cortez Intern, on 6/15/2019 at 11:28 PM.  [facility]  [phone]

## 2019-06-18 LAB
ABSOLUTE EOS #: 0.66 K/UL (ref 0–0.4)
ABSOLUTE IMMATURE GRANULOCYTE: 0 K/UL (ref 0–0.3)
ABSOLUTE LYMPH #: 1.98 K/UL (ref 1–4.8)
ABSOLUTE MONO #: 1.32 K/UL (ref 0.1–0.8)
BASOPHILS # BLD: 1 % (ref 0–2)
BASOPHILS ABSOLUTE: 0.17 K/UL (ref 0–0.2)
DIFFERENTIAL TYPE: ABNORMAL
EOSINOPHILS RELATIVE PERCENT: 4 % (ref 1–4)
ESTIMATED AVERAGE GLUCOSE: 192 MG/DL
GLUCOSE BLD-MCNC: 171 MG/DL (ref 75–110)
GLUCOSE BLD-MCNC: 173 MG/DL (ref 75–110)
GLUCOSE BLD-MCNC: 189 MG/DL (ref 75–110)
GLUCOSE BLD-MCNC: 191 MG/DL (ref 75–110)
HBA1C MFR BLD: 8.3 % (ref 4–6)
HCT VFR BLD CALC: 41 % (ref 40.7–50.3)
HEMOGLOBIN: 12.7 G/DL (ref 13–17)
IMMATURE GRANULOCYTES: 0 %
LYMPHOCYTES # BLD: 12 % (ref 24–44)
MCH RBC QN AUTO: 28.3 PG (ref 25.2–33.5)
MCHC RBC AUTO-ENTMCNC: 31 G/DL (ref 28.4–34.8)
MCV RBC AUTO: 91.5 FL (ref 82.6–102.9)
MONOCYTES # BLD: 8 % (ref 1–7)
MORPHOLOGY: NORMAL
NRBC AUTOMATED: 0 PER 100 WBC
PDW BLD-RTO: 13.2 % (ref 11.8–14.4)
PLATELET # BLD: 287 K/UL (ref 138–453)
PLATELET ESTIMATE: ABNORMAL
PMV BLD AUTO: 11.7 FL (ref 8.1–13.5)
RBC # BLD: 4.48 M/UL (ref 4.21–5.77)
RBC # BLD: ABNORMAL 10*6/UL
SEG NEUTROPHILS: 75 % (ref 36–66)
SEGMENTED NEUTROPHILS ABSOLUTE COUNT: 12.37 K/UL (ref 1.8–7.7)
WBC # BLD: 16.5 K/UL (ref 3.5–11.3)
WBC # BLD: ABNORMAL 10*3/UL

## 2019-06-18 PROCEDURE — 6360000002 HC RX W HCPCS: Performed by: STUDENT IN AN ORGANIZED HEALTH CARE EDUCATION/TRAINING PROGRAM

## 2019-06-18 PROCEDURE — 6370000000 HC RX 637 (ALT 250 FOR IP): Performed by: NURSE PRACTITIONER

## 2019-06-18 PROCEDURE — 2060000000 HC ICU INTERMEDIATE R&B

## 2019-06-18 PROCEDURE — 97116 GAIT TRAINING THERAPY: CPT

## 2019-06-18 PROCEDURE — 82947 ASSAY GLUCOSE BLOOD QUANT: CPT

## 2019-06-18 PROCEDURE — 6370000000 HC RX 637 (ALT 250 FOR IP): Performed by: STUDENT IN AN ORGANIZED HEALTH CARE EDUCATION/TRAINING PROGRAM

## 2019-06-18 PROCEDURE — 36415 COLL VENOUS BLD VENIPUNCTURE: CPT

## 2019-06-18 PROCEDURE — 97110 THERAPEUTIC EXERCISES: CPT

## 2019-06-18 PROCEDURE — 85025 COMPLETE CBC W/AUTO DIFF WBC: CPT

## 2019-06-18 PROCEDURE — 2580000003 HC RX 258: Performed by: STUDENT IN AN ORGANIZED HEALTH CARE EDUCATION/TRAINING PROGRAM

## 2019-06-18 PROCEDURE — 97127 HC SP THER IVNTJ W/FOCUS COG FUNCJ: CPT

## 2019-06-18 RX ORDER — BISACODYL 10 MG
10 SUPPOSITORY, RECTAL RECTAL DAILY PRN
Status: DISCONTINUED | OUTPATIENT
Start: 2019-06-18 | End: 2019-06-19

## 2019-06-18 RX ADMIN — ENOXAPARIN SODIUM 30 MG: 100 INJECTION SUBCUTANEOUS at 20:13

## 2019-06-18 RX ADMIN — SODIUM CHLORIDE, PRESERVATIVE FREE 10 ML: 5 INJECTION INTRAVENOUS at 21:00

## 2019-06-18 RX ADMIN — IBUPROFEN 400 MG: 400 TABLET, FILM COATED ORAL at 10:00

## 2019-06-18 RX ADMIN — DOCUSATE SODIUM 100 MG: 100 CAPSULE, LIQUID FILLED ORAL at 20:12

## 2019-06-18 RX ADMIN — GLIMEPIRIDE 4 MG: 2 TABLET ORAL at 10:00

## 2019-06-18 RX ADMIN — IBUPROFEN 400 MG: 400 TABLET, FILM COATED ORAL at 00:13

## 2019-06-18 RX ADMIN — GABAPENTIN 300 MG: 600 TABLET ORAL at 12:53

## 2019-06-18 RX ADMIN — OXYCODONE HYDROCHLORIDE 5 MG: 5 TABLET ORAL at 17:55

## 2019-06-18 RX ADMIN — GLIMEPIRIDE 4 MG: 2 TABLET ORAL at 20:12

## 2019-06-18 RX ADMIN — ACETAMINOPHEN 1000 MG: 500 TABLET ORAL at 22:52

## 2019-06-18 RX ADMIN — IBUPROFEN 400 MG: 400 TABLET, FILM COATED ORAL at 05:19

## 2019-06-18 RX ADMIN — OXYCODONE HYDROCHLORIDE 5 MG: 5 TABLET ORAL at 22:52

## 2019-06-18 RX ADMIN — INSULIN LISPRO 3 UNITS: 100 INJECTION, SOLUTION INTRAVENOUS; SUBCUTANEOUS at 16:55

## 2019-06-18 RX ADMIN — GABAPENTIN 300 MG: 600 TABLET ORAL at 05:19

## 2019-06-18 RX ADMIN — SODIUM CHLORIDE, PRESERVATIVE FREE 10 ML: 5 INJECTION INTRAVENOUS at 10:09

## 2019-06-18 RX ADMIN — INSULIN LISPRO 2 UNITS: 100 INJECTION, SOLUTION INTRAVENOUS; SUBCUTANEOUS at 20:13

## 2019-06-18 RX ADMIN — IBUPROFEN 400 MG: 400 TABLET, FILM COATED ORAL at 12:53

## 2019-06-18 RX ADMIN — MAGNESIUM HYDROXIDE 30 ML: 400 SUSPENSION ORAL at 14:20

## 2019-06-18 RX ADMIN — INSULIN LISPRO 25 UNITS: 100 INJECTION, SUSPENSION SUBCUTANEOUS at 20:13

## 2019-06-18 RX ADMIN — INSULIN LISPRO 35 UNITS: 100 INJECTION, SUSPENSION SUBCUTANEOUS at 10:00

## 2019-06-18 RX ADMIN — IBUPROFEN 400 MG: 400 TABLET, FILM COATED ORAL at 16:55

## 2019-06-18 RX ADMIN — OXYCODONE HYDROCHLORIDE 5 MG: 5 TABLET ORAL at 01:15

## 2019-06-18 RX ADMIN — GABAPENTIN 300 MG: 600 TABLET ORAL at 20:12

## 2019-06-18 RX ADMIN — ACETAMINOPHEN 1000 MG: 500 TABLET ORAL at 06:36

## 2019-06-18 RX ADMIN — AMLODIPINE BESYLATE 10 MG: 10 TABLET ORAL at 10:06

## 2019-06-18 RX ADMIN — ACETAMINOPHEN 1000 MG: 500 TABLET ORAL at 14:20

## 2019-06-18 RX ADMIN — CYCLOBENZAPRINE HYDROCHLORIDE 5 MG: 5 TABLET, FILM COATED ORAL at 06:36

## 2019-06-18 RX ADMIN — ENOXAPARIN SODIUM 30 MG: 100 INJECTION SUBCUTANEOUS at 10:00

## 2019-06-18 RX ADMIN — IBUPROFEN 400 MG: 400 TABLET, FILM COATED ORAL at 20:12

## 2019-06-18 RX ADMIN — CYCLOBENZAPRINE HYDROCHLORIDE 5 MG: 5 TABLET, FILM COATED ORAL at 14:20

## 2019-06-18 RX ADMIN — HYDROCHLOROTHIAZIDE 25 MG: 25 TABLET ORAL at 10:00

## 2019-06-18 RX ADMIN — CYCLOBENZAPRINE HYDROCHLORIDE 5 MG: 5 TABLET, FILM COATED ORAL at 23:01

## 2019-06-18 RX ADMIN — INSULIN LISPRO 3 UNITS: 100 INJECTION, SOLUTION INTRAVENOUS; SUBCUTANEOUS at 10:01

## 2019-06-18 RX ADMIN — OXYCODONE HYDROCHLORIDE 5 MG: 5 TABLET ORAL at 05:19

## 2019-06-18 RX ADMIN — CYCLOBENZAPRINE HYDROCHLORIDE 5 MG: 5 TABLET, FILM COATED ORAL at 00:14

## 2019-06-18 RX ADMIN — DOCUSATE SODIUM 100 MG: 100 CAPSULE, LIQUID FILLED ORAL at 10:06

## 2019-06-18 ASSESSMENT — PAIN DESCRIPTION - LOCATION: LOCATION: RIB CAGE;STERNUM

## 2019-06-18 ASSESSMENT — PAIN SCALES - GENERAL
PAINLEVEL_OUTOF10: 4
PAINLEVEL_OUTOF10: 7
PAINLEVEL_OUTOF10: 5
PAINLEVEL_OUTOF10: 4
PAINLEVEL_OUTOF10: 7
PAINLEVEL_OUTOF10: 6
PAINLEVEL_OUTOF10: 5
PAINLEVEL_OUTOF10: 7
PAINLEVEL_OUTOF10: 7
PAINLEVEL_OUTOF10: 10
PAINLEVEL_OUTOF10: 4
PAINLEVEL_OUTOF10: 4

## 2019-06-18 ASSESSMENT — PAIN DESCRIPTION - PAIN TYPE: TYPE: ACUTE PAIN

## 2019-06-18 ASSESSMENT — PAIN - FUNCTIONAL ASSESSMENT: PAIN_FUNCTIONAL_ASSESSMENT: ACTIVITIES ARE NOT PREVENTED
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
## 2019-06-18 NOTE — PROGRESS NOTES
Pt's bupivacaine nerve block running at continuous rate of 6mLs. PRN dose of 3mL. 5 attempts made and 2 PRN doses given. Volume infused 34.85mLs.  Residual volume 136.4mLs.
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
## 2019-06-18 NOTE — CARE COORDINATION
Transitional Planning  Met with patient and family to discuss discharge plan. Patient states he will not need walker as he already has one at home. [facility] list provided for review and choice.

## 2019-06-18 NOTE — PROGRESS NOTES
Smoking Cessation - topics covered   []  Health Risks  []  Benefits of Quitting   []  Smoking Cessation  [x]  Patient has no history of tobacco use  []  Patient is former smoker. [x]  No need for tobacco cessation education. []  Booklet given  []  Patient verbalizes understanding. []  Patient denies need for tobacco cessation education. []  Unable to meet with patient today. Will follow up as able.   Raulito Zepeda  7:43 AM
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
## 2019-06-18 NOTE — PROGRESS NOTES
Pt's bupivacaine nerve block running at continuous rate of 6mLs. PRN dose of 3mL. 15 attempts made and 6 PRN doses given. Volume infused 37.1 mLs. Residual volume 99.3 mLs.

## 2019-06-18 NOTE — PROGRESS NOTES
Alcohol Screening and Brief Intervention        Recent Labs     06/15/19  1955   ALC <10       Alcohol Pre-screening  (MEN ONLY) How many times in the past year have you had 5 or more drinks in a day?: None       Alcohol Screening Audit       Drug Pre-Screening   How many times in the past year have you used a recreational drug or used a prescription medication for nonmedical reasons?: None    Drug Screening DAST       Mood Pre-Screening (PHQ-2)  During the past two weeks, have you been bothered by little interest or pleasure in doing things?: No  During the past two weeks, have you been bothered by feeling down, depressed, or hopeless?: No    Mood Pre-Screening (PHQ-9)         I have interviewed Daniel Officer, [id number] regarding  His alcohol consumption/drug use and risk for excessive use. Screenings were negative. Patient  N/A intervention at this time.  P  Deferred []    Completed on: 6/18/2019   Esha Souza RN

## 2019-06-18 NOTE — PROGRESS NOTES
Pt's bupivacaine nerve block running at continuous rate of 6mLs. PRN dose of 3mLs. 24 attempts made and 9 PRN doses given. Volume infused 30.85 mLs. Residual volume 68.5 mLs.
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
## 2019-06-18 NOTE — PLAN OF CARE
Problem: Breathing Pattern - Ineffective:  Goal: Ability to achieve and maintain a regular respiratory rate will improve  Description  Ability to achieve and maintain a regular respiratory rate will improve  Outcome: Met This Shift     Problem: Pain:  Goal: Pain level will decrease  Description  Pain level will decrease  Outcome: Met This Shift     Problem: Falls - Risk of:  Goal: Will remain free from falls  Description  Will remain free from falls  6/18/2019 0342 by Demetris Sheppard RN  Outcome: Met This Shift

## 2019-06-19 ENCOUNTER — APPOINTMENT (OUTPATIENT)
Dept: GENERAL RADIOLOGY | Age: 69
DRG: 199 | End: 2019-06-19
Payer: OTHER MISCELLANEOUS

## 2019-06-19 LAB
GLUCOSE BLD-MCNC: 120 MG/DL (ref 75–110)
GLUCOSE BLD-MCNC: 131 MG/DL (ref 75–110)
GLUCOSE BLD-MCNC: 137 MG/DL (ref 75–110)
GLUCOSE BLD-MCNC: 159 MG/DL (ref 75–110)

## 2019-06-19 PROCEDURE — 2580000003 HC RX 258: Performed by: STUDENT IN AN ORGANIZED HEALTH CARE EDUCATION/TRAINING PROGRAM

## 2019-06-19 PROCEDURE — 97127 HC SP THER IVNTJ W/FOCUS COG FUNCJ: CPT

## 2019-06-19 PROCEDURE — 97116 GAIT TRAINING THERAPY: CPT

## 2019-06-19 PROCEDURE — 2700000000 HC OXYGEN THERAPY PER DAY

## 2019-06-19 PROCEDURE — 6360000002 HC RX W HCPCS: Performed by: STUDENT IN AN ORGANIZED HEALTH CARE EDUCATION/TRAINING PROGRAM

## 2019-06-19 PROCEDURE — 6370000000 HC RX 637 (ALT 250 FOR IP): Performed by: EMERGENCY MEDICINE

## 2019-06-19 PROCEDURE — 82947 ASSAY GLUCOSE BLOOD QUANT: CPT

## 2019-06-19 PROCEDURE — 94761 N-INVAS EAR/PLS OXIMETRY MLT: CPT

## 2019-06-19 PROCEDURE — 6370000000 HC RX 637 (ALT 250 FOR IP): Performed by: NURSE PRACTITIONER

## 2019-06-19 PROCEDURE — 2500000003 HC RX 250 WO HCPCS: Performed by: ANESTHESIOLOGY

## 2019-06-19 PROCEDURE — 6370000000 HC RX 637 (ALT 250 FOR IP): Performed by: STUDENT IN AN ORGANIZED HEALTH CARE EDUCATION/TRAINING PROGRAM

## 2019-06-19 PROCEDURE — 71045 X-RAY EXAM CHEST 1 VIEW: CPT

## 2019-06-19 PROCEDURE — 2060000000 HC ICU INTERMEDIATE R&B

## 2019-06-19 RX ORDER — BISACODYL 10 MG
10 SUPPOSITORY, RECTAL RECTAL DAILY
Status: DISCONTINUED | OUTPATIENT
Start: 2019-06-19 | End: 2019-06-20 | Stop reason: HOSPADM

## 2019-06-19 RX ORDER — DOCUSATE SODIUM 100 MG/1
200 CAPSULE, LIQUID FILLED ORAL 2 TIMES DAILY
Status: DISCONTINUED | OUTPATIENT
Start: 2019-06-19 | End: 2019-06-20 | Stop reason: HOSPADM

## 2019-06-19 RX ORDER — POLYETHYLENE GLYCOL 3350 17 G/17G
17 POWDER, FOR SOLUTION ORAL 2 TIMES DAILY
Status: DISCONTINUED | OUTPATIENT
Start: 2019-06-19 | End: 2019-06-20 | Stop reason: HOSPADM

## 2019-06-19 RX ORDER — GABAPENTIN 600 MG/1
600 TABLET ORAL EVERY 8 HOURS
Status: DISCONTINUED | OUTPATIENT
Start: 2019-06-19 | End: 2019-06-20 | Stop reason: HOSPADM

## 2019-06-19 RX ORDER — MAGNESIUM CARB/ALUMINUM HYDROX 105-160MG
30 TABLET,CHEWABLE ORAL DAILY PRN
Status: DISCONTINUED | OUTPATIENT
Start: 2019-06-19 | End: 2019-06-20 | Stop reason: HOSPADM

## 2019-06-19 RX ORDER — IBUPROFEN 800 MG/1
800 TABLET ORAL EVERY 8 HOURS
Status: DISCONTINUED | OUTPATIENT
Start: 2019-06-19 | End: 2019-06-20 | Stop reason: HOSPADM

## 2019-06-19 RX ADMIN — GLIMEPIRIDE 4 MG: 2 TABLET ORAL at 08:17

## 2019-06-19 RX ADMIN — ENOXAPARIN SODIUM 30 MG: 100 INJECTION SUBCUTANEOUS at 08:18

## 2019-06-19 RX ADMIN — TAMSULOSIN HYDROCHLORIDE 0.4 MG: 0.4 CAPSULE ORAL at 08:17

## 2019-06-19 RX ADMIN — GLIMEPIRIDE 4 MG: 2 TABLET ORAL at 21:22

## 2019-06-19 RX ADMIN — OXYCODONE HYDROCHLORIDE 5 MG: 5 TABLET ORAL at 20:18

## 2019-06-19 RX ADMIN — INSULIN LISPRO 3 UNITS: 100 INJECTION, SOLUTION INTRAVENOUS; SUBCUTANEOUS at 17:05

## 2019-06-19 RX ADMIN — CYCLOBENZAPRINE HYDROCHLORIDE 5 MG: 5 TABLET, FILM COATED ORAL at 23:09

## 2019-06-19 RX ADMIN — INSULIN LISPRO 25 UNITS: 100 INJECTION, SUSPENSION SUBCUTANEOUS at 21:23

## 2019-06-19 RX ADMIN — ENOXAPARIN SODIUM 30 MG: 100 INJECTION SUBCUTANEOUS at 21:20

## 2019-06-19 RX ADMIN — MAGNESIUM HYDROXIDE 30 ML: 400 SUSPENSION ORAL at 12:29

## 2019-06-19 RX ADMIN — CYCLOBENZAPRINE HYDROCHLORIDE 5 MG: 5 TABLET, FILM COATED ORAL at 14:37

## 2019-06-19 RX ADMIN — OXYCODONE HYDROCHLORIDE 5 MG: 5 TABLET ORAL at 08:18

## 2019-06-19 RX ADMIN — DOCUSATE SODIUM 100 MG: 100 CAPSULE, LIQUID FILLED ORAL at 08:17

## 2019-06-19 RX ADMIN — OXYCODONE HYDROCHLORIDE 5 MG: 5 TABLET ORAL at 12:23

## 2019-06-19 RX ADMIN — AMLODIPINE BESYLATE 10 MG: 10 TABLET ORAL at 08:17

## 2019-06-19 RX ADMIN — CYCLOBENZAPRINE HYDROCHLORIDE 5 MG: 5 TABLET, FILM COATED ORAL at 06:10

## 2019-06-19 RX ADMIN — IBUPROFEN 400 MG: 400 TABLET, FILM COATED ORAL at 06:06

## 2019-06-19 RX ADMIN — Medication 500 ML: at 02:08

## 2019-06-19 RX ADMIN — ACETAMINOPHEN 1000 MG: 500 TABLET ORAL at 14:37

## 2019-06-19 RX ADMIN — BISACODYL 10 MG: 10 SUPPOSITORY RECTAL at 12:23

## 2019-06-19 RX ADMIN — HYDROCHLOROTHIAZIDE 25 MG: 25 TABLET ORAL at 08:17

## 2019-06-19 RX ADMIN — GABAPENTIN 600 MG: 600 TABLET ORAL at 12:31

## 2019-06-19 RX ADMIN — GABAPENTIN 300 MG: 600 TABLET ORAL at 06:06

## 2019-06-19 RX ADMIN — SODIUM CHLORIDE, PRESERVATIVE FREE 10 ML: 5 INJECTION INTRAVENOUS at 21:35

## 2019-06-19 RX ADMIN — ACETAMINOPHEN 1000 MG: 500 TABLET ORAL at 21:22

## 2019-06-19 RX ADMIN — IBUPROFEN 800 MG: 800 TABLET, FILM COATED ORAL at 14:37

## 2019-06-19 RX ADMIN — ACETAMINOPHEN 1000 MG: 500 TABLET ORAL at 06:06

## 2019-06-19 RX ADMIN — GABAPENTIN 600 MG: 600 TABLET ORAL at 21:22

## 2019-06-19 RX ADMIN — IBUPROFEN 800 MG: 800 TABLET, FILM COATED ORAL at 21:22

## 2019-06-19 RX ADMIN — INSULIN LISPRO 35 UNITS: 100 INJECTION, SUSPENSION SUBCUTANEOUS at 08:18

## 2019-06-19 ASSESSMENT — PAIN SCALES - GENERAL
PAINLEVEL_OUTOF10: 0
PAINLEVEL_OUTOF10: 6
PAINLEVEL_OUTOF10: 8
PAINLEVEL_OUTOF10: 9
PAINLEVEL_OUTOF10: 10
PAINLEVEL_OUTOF10: 9
PAINLEVEL_OUTOF10: 3
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
## 2019-06-19 NOTE — PROGRESS NOTES
6/19/19    0800  Pt's bupivacaine nerve block running at continuous rate of 6mLs/hr. PRN dose of 3mL. 4 attempts made and 4 doses were given. Volume infused was 30.9mL. Reservoir volume of 218.9mL.    1200  Pt's bupivacaine nerve block running at continuous rate of 6mLs/hr. PRN dose of 3mL. 4 attempts made and 2 doses were given. Volume infused was 49.7mL. Reservoir volume of 169.2mL.    1600  Pt;s bupivacaine nerve block running at continuous rate of 6mLs/hr. PRN dose of 3mL. 1 attempt made and 1 dose was given. Volume infused was 21.45mL. Reservoir volume of 147.7mL.     Electronically signed by Shanna Warren RN on 6/19/2019 at 5:56 PM

## 2019-06-19 NOTE — PROGRESS NOTES
6/18/19 2000    Pt's bupivacaine nerve blocking running at continuous rate of 6mLs/hr. PRN dose of 3 mL. 28 attempts made and 10 PRN doses given. Volume infused 16.6 mL. Reservoir volume of 51.7 mL.    6/19/19 0000    Pt's bupivacaine nerve blocked running at continuous rate of 6mLs/hr. PRN dose of 3 mL. 4 attempts made and 2 PRN doses given. Volume infused 27.95 mL. Reservoir volume of 23.8 mL.     6/19/19 0210 New bag of bupivacaine. 70 mL wasted    6/19/19 0400    Pt's bupivacaine nerve blocked running at continuous rate of 6mLs/hr. PRN dose of 3 mL. 0 attempts made and 0 PRN doses given. Volume infused 13.2 mL. Reservoir volume 249.8 mL.

## 2019-06-20 VITALS
DIASTOLIC BLOOD PRESSURE: 65 MMHG | HEART RATE: 85 BPM | WEIGHT: 229.5 LBS | HEIGHT: 71 IN | OXYGEN SATURATION: 92 % | SYSTOLIC BLOOD PRESSURE: 140 MMHG | BODY MASS INDEX: 32.13 KG/M2 | TEMPERATURE: 97.6 F | RESPIRATION RATE: 16 BRPM

## 2019-06-20 LAB
GLUCOSE BLD-MCNC: 118 MG/DL (ref 75–110)
GLUCOSE BLD-MCNC: 148 MG/DL (ref 75–110)
GLUCOSE BLD-MCNC: 211 MG/DL (ref 75–110)

## 2019-06-20 PROCEDURE — 82947 ASSAY GLUCOSE BLOOD QUANT: CPT

## 2019-06-20 PROCEDURE — 6370000000 HC RX 637 (ALT 250 FOR IP): Performed by: STUDENT IN AN ORGANIZED HEALTH CARE EDUCATION/TRAINING PROGRAM

## 2019-06-20 PROCEDURE — 2700000000 HC OXYGEN THERAPY PER DAY

## 2019-06-20 PROCEDURE — 97116 GAIT TRAINING THERAPY: CPT

## 2019-06-20 PROCEDURE — 97110 THERAPEUTIC EXERCISES: CPT

## 2019-06-20 PROCEDURE — 6370000000 HC RX 637 (ALT 250 FOR IP): Performed by: EMERGENCY MEDICINE

## 2019-06-20 PROCEDURE — 6370000000 HC RX 637 (ALT 250 FOR IP): Performed by: NURSE PRACTITIONER

## 2019-06-20 PROCEDURE — 94761 N-INVAS EAR/PLS OXIMETRY MLT: CPT

## 2019-06-20 PROCEDURE — 2580000003 HC RX 258: Performed by: STUDENT IN AN ORGANIZED HEALTH CARE EDUCATION/TRAINING PROGRAM

## 2019-06-20 RX ORDER — PSEUDOEPHEDRINE HCL 30 MG
200 TABLET ORAL 2 TIMES DAILY
Qty: 30 CAPSULE | Refills: 0 | Status: SHIPPED | OUTPATIENT
Start: 2019-06-20

## 2019-06-20 RX ORDER — IBUPROFEN 800 MG/1
800 TABLET ORAL EVERY 8 HOURS
Qty: 30 TABLET | Refills: 0 | Status: SHIPPED | OUTPATIENT
Start: 2019-06-20

## 2019-06-20 RX ORDER — TAMSULOSIN HYDROCHLORIDE 0.4 MG/1
0.4 CAPSULE ORAL DAILY
Qty: 30 CAPSULE | Refills: 0 | Status: SHIPPED | OUTPATIENT
Start: 2019-06-21

## 2019-06-20 RX ORDER — LIDOCAINE 4 G/G
2 PATCH TOPICAL DAILY
Qty: 1 BOX | Refills: 0 | Status: SHIPPED | OUTPATIENT
Start: 2019-06-21

## 2019-06-20 RX ORDER — CYCLOBENZAPRINE HCL 5 MG
5 TABLET ORAL EVERY 8 HOURS
Qty: 30 TABLET | Refills: 0 | Status: SHIPPED | OUTPATIENT
Start: 2019-06-20 | End: 2019-06-30

## 2019-06-20 RX ORDER — OXYCODONE HYDROCHLORIDE 5 MG/1
5 TABLET ORAL EVERY 6 HOURS PRN
Qty: 20 TABLET | Refills: 0 | Status: SHIPPED | OUTPATIENT
Start: 2019-06-20 | End: 2019-06-25

## 2019-06-20 RX ORDER — GABAPENTIN 600 MG/1
600 TABLET ORAL EVERY 8 HOURS
Qty: 30 TABLET | Refills: 0 | Status: SHIPPED | OUTPATIENT
Start: 2019-06-20 | End: 2019-06-30

## 2019-06-20 RX ORDER — ACETAMINOPHEN 500 MG
1000 TABLET ORAL EVERY 8 HOURS SCHEDULED
Qty: 120 TABLET | Refills: 0 | Status: SHIPPED | OUTPATIENT
Start: 2019-06-20

## 2019-06-20 RX ADMIN — DOCUSATE SODIUM 200 MG: 100 CAPSULE, LIQUID FILLED ORAL at 08:36

## 2019-06-20 RX ADMIN — GABAPENTIN 600 MG: 600 TABLET ORAL at 12:22

## 2019-06-20 RX ADMIN — AMLODIPINE BESYLATE 10 MG: 10 TABLET ORAL at 08:36

## 2019-06-20 RX ADMIN — OXYCODONE HYDROCHLORIDE 5 MG: 5 TABLET ORAL at 18:47

## 2019-06-20 RX ADMIN — OXYCODONE HYDROCHLORIDE 5 MG: 5 TABLET ORAL at 08:35

## 2019-06-20 RX ADMIN — OXYCODONE HYDROCHLORIDE 5 MG: 5 TABLET ORAL at 04:24

## 2019-06-20 RX ADMIN — SODIUM CHLORIDE, PRESERVATIVE FREE 10 ML: 5 INJECTION INTRAVENOUS at 08:44

## 2019-06-20 RX ADMIN — IBUPROFEN 800 MG: 800 TABLET, FILM COATED ORAL at 13:55

## 2019-06-20 RX ADMIN — IBUPROFEN 800 MG: 800 TABLET, FILM COATED ORAL at 06:50

## 2019-06-20 RX ADMIN — INSULIN LISPRO 3 UNITS: 100 INJECTION, SOLUTION INTRAVENOUS; SUBCUTANEOUS at 08:38

## 2019-06-20 RX ADMIN — CYCLOBENZAPRINE HYDROCHLORIDE 5 MG: 5 TABLET, FILM COATED ORAL at 06:59

## 2019-06-20 RX ADMIN — INSULIN LISPRO 6 UNITS: 100 INJECTION, SOLUTION INTRAVENOUS; SUBCUTANEOUS at 17:32

## 2019-06-20 RX ADMIN — OXYCODONE HYDROCHLORIDE 5 MG: 5 TABLET ORAL at 12:28

## 2019-06-20 RX ADMIN — HYDROCHLOROTHIAZIDE 25 MG: 25 TABLET ORAL at 08:36

## 2019-06-20 RX ADMIN — ACETAMINOPHEN 1000 MG: 500 TABLET ORAL at 06:50

## 2019-06-20 RX ADMIN — OXYCODONE HYDROCHLORIDE 5 MG: 5 TABLET ORAL at 00:18

## 2019-06-20 RX ADMIN — CYCLOBENZAPRINE HYDROCHLORIDE 5 MG: 5 TABLET, FILM COATED ORAL at 15:18

## 2019-06-20 RX ADMIN — GABAPENTIN 600 MG: 600 TABLET ORAL at 04:24

## 2019-06-20 RX ADMIN — ACETAMINOPHEN 1000 MG: 500 TABLET ORAL at 13:54

## 2019-06-20 RX ADMIN — TAMSULOSIN HYDROCHLORIDE 0.4 MG: 0.4 CAPSULE ORAL at 08:36

## 2019-06-20 RX ADMIN — INSULIN LISPRO 35 UNITS: 100 INJECTION, SUSPENSION SUBCUTANEOUS at 08:38

## 2019-06-20 RX ADMIN — GLIMEPIRIDE 4 MG: 2 TABLET ORAL at 08:36

## 2019-06-20 ASSESSMENT — PAIN DESCRIPTION - FREQUENCY: FREQUENCY: INTERMITTENT

## 2019-06-20 ASSESSMENT — PAIN SCALES - GENERAL
PAINLEVEL_OUTOF10: 8
PAINLEVEL_OUTOF10: 5
PAINLEVEL_OUTOF10: 6
PAINLEVEL_OUTOF10: 10
PAINLEVEL_OUTOF10: 10
PAINLEVEL_OUTOF10: 8
PAINLEVEL_OUTOF10: 8
PAINLEVEL_OUTOF10: 10

## 2019-06-20 ASSESSMENT — PAIN DESCRIPTION - PAIN TYPE: TYPE: ACUTE PAIN

## 2019-06-20 ASSESSMENT — PAIN - FUNCTIONAL ASSESSMENT: PAIN_FUNCTIONAL_ASSESSMENT: ACTIVITIES ARE NOT PREVENTED

## 2019-06-20 ASSESSMENT — PAIN DESCRIPTION - PROGRESSION: CLINICAL_PROGRESSION: NOT CHANGED

## 2019-06-20 ASSESSMENT — PAIN DESCRIPTION - ONSET: ONSET: GRADUAL

## 2019-06-20 ASSESSMENT — PAIN DESCRIPTION - LOCATION: LOCATION: CHEST

## 2019-06-20 ASSESSMENT — PAIN DESCRIPTION - ORIENTATION: ORIENTATION: RIGHT

## 2019-06-20 ASSESSMENT — PAIN DESCRIPTION - DESCRIPTORS: DESCRIPTORS: ACHING
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
## 2019-06-20 NOTE — PROGRESS NOTES
Pt placed on 4L nasal cannula of oxygen and has had saturation at 92%.   Electronically signed by Heydi Mckinnon RN on 6/20/2019 at 4:32 PM
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
## 2019-06-20 NOTE — PLAN OF CARE
Pt able to communicate pain as needed, uses call light appropriately. Pt satisfied with pain interventions.  Will continue to monitor

## 2019-06-20 NOTE — PROGRESS NOTES
6/20/19    0800  Pt's bupivacaine nerve block running at continuous rate of 6mL/hr. PRN dose of 3mL. 4 doses were given and 10 attempts were made. Volume infused 32.25mL. Reservoir volume is 40.7mL.    1200  Pt's bupivacaine nerve block running at continuous rate 6mL/hr. PRN dose of 3mL. 3 doses were given and 13 attempts were made. Volume infused was 37.35mL and reservoir volume is 8.3mL.     Electronically signed by Shanna Warren RN on 6/20/2019 at 6:09 PM

## 2019-06-20 NOTE — PROGRESS NOTES
6/19/19 2130    Pt's bupivacaine nerve block running at continuous rate of 6 mL/hr. PRN dose of 3 mL. 2 attempts made and 1 PRN dose given. Volume infused 25.45 mL. Reservoir volume of 122.2 mL.    6/20/19 0030    Pt's bupivacaine nerve block running at continuous rate of 6 mL/hr. PRN dose of 3 mL. 4 attempts made and 1 PRN dose given. Volume infused 28.35 mL. Reservoir volume of 93.9 mL.    6/20/19 0530    Pt's bupivacaine nerve block running at continuous rate of 6 mL/hr. PRN dose of 3 mL. 0 attempts made and 0 PRN doses given. Volume infused 21.1 mL. Reservoir volume of 72.8 mL.

## 2019-06-20 NOTE — PROGRESS NOTES
Home Oxygen Evaluation    Home Oxygen Evaluation completed. Patient is on 5 liters per minute via nasal cannula.   Resting SpO2 = 91%  Resting SpO2 on room air = 86%    SpO2 on room air with exercise = n/a  SpO2 on oxygen as above with exercise = n/a      HALI MOHR  11:34 AM

## 2019-06-20 NOTE — CARE COORDINATION
Transition Planning:  Informed by Ina Cortez RT that pt meets criteria for home O2.    1430 [employer] by bedside nurse pt's family would like to meet to discuss [facility] [place], [facility]. options 1630  Met with pt and family discussed HomeCare pt requests referral to [facility]. [address] [facility] with Patricia Escobar with [facility] informed of new referral for home oxygen will send all orders and notes when available. Christie [address]. with Harpal Zaragoza at [facility] informed of new referral. Informed pt will go to his son's home temporarily and then return home as he improves. Provided pt's son Riley's address [address] [place] [address]. Ghulam Knapp, [facility]. Phone [phone]. [phone] with Patricia Escobar informed of pt tolerating O2 at 4L per RN update. DME order for O2, face to face, RT home O2 Evaluation, RN progress note and H&P sent for home oxygen referral.     Writer request LORENE to be completed by Attending. 1800 Spoke with Patricia Escobar at [facility] to confirm if order is able to be filled and oxygen delivered to pt room tonight. She anticipates delivery will be avaiable this evening. She will contact her  and return call back to CM or nursing unit. Updates provided to bedside nurse. 1810 Spoke to Harpal Zaragoza at [facility] she confirms they will open pt for care on 6/21/19. Informed await Oxygen delivery if not completed tonight pt will not d/c. She will have [address] [employer] contact pt's son in AM to set up visit.
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
## 2022-01-03 ENCOUNTER — OFFICE VISIT (OUTPATIENT)
Dept: PODIATRY | Age: 72
End: 2022-01-03
Payer: COMMERCIAL

## 2022-01-03 VITALS — HEIGHT: 69 IN | BODY MASS INDEX: 32.14 KG/M2 | WEIGHT: 217 LBS

## 2022-01-03 DIAGNOSIS — I73.9 PERIPHERAL VASCULAR DISORDER (HCC): ICD-10-CM

## 2022-01-03 DIAGNOSIS — B35.1 DERMATOPHYTOSIS OF NAIL: Primary | ICD-10-CM

## 2022-01-03 DIAGNOSIS — E11.49 OTHER DIABETIC NEUROLOGICAL COMPLICATION ASSOCIATED WITH TYPE 2 DIABETES MELLITUS (HCC): ICD-10-CM

## 2022-01-03 DIAGNOSIS — R60.0 EDEMA OF LOWER EXTREMITY: ICD-10-CM

## 2022-01-03 PROCEDURE — 99203 OFFICE O/P NEW LOW 30 MIN: CPT | Performed by: PODIATRIST

## 2022-01-03 PROCEDURE — 11721 DEBRIDE NAIL 6 OR MORE: CPT | Performed by: PODIATRIST

## 2022-01-03 NOTE — PATIENT INSTRUCTIONS
Schedule a Vaccine  When you qualify to receive the vaccine, call the [facility]) COVID-19 Vaccination Hotline to schedule your appointment or to get additional information about the [facility]) locations which are offering the COVID-19 vaccine. To be 94% effective, it's important that you receive two doses of one of the COVID-19 vaccines. -If you are receiving the Rader Peter vaccine, your second shot will be scheduled as close to 21 days after the first shot as possible. -If you are receiving the Moderna vaccine, your second shot will be scheduled as close to 28 days after the first shot as possible. [facility]) COVID-19 Vaccination Hotline: [phone]    Links to [facility]) website and [facility] website:    [facility]-monitoring-coronavirus-covid-19/covid-19-vaccine/[email or web address]    [email or web address]

## 2022-01-03 NOTE — PROGRESS NOTES
[address] [facility] PODIATRY [facility]  [address] [place] [address]  Dept: [phone]  Dept Fax: [phone]    NEW PATIENT DIABETIC PROGRESS NOTE  Date of patient's visit: 1/3/2022  Patient's Name:  Melissa Rudd YOB: 1950            Patient Care Team:  Juliana Briseno DO as PCP - General (Family Medicine)  Adan Sun DPM as Physician (Podiatry)          Chief Complaint   Patient presents with    New Patient    Diabetes     [facility] [place]     Peripheral Neuropathy       Subjective:   Melissa Rudd comes to clinic for New Patient, Diabetes [facility] & [place] ), and Peripheral Neuropathy    he is a diabetic and states that needs toenails trimmed today. Pt currently has complaint of thickened, elongated nails that they cannot manage by themselves. Pt's primary care physician is Juliana Briseno DO last seen 12/22/21   Pt's last blood sugar was patient states did not check today. Lab Results   Component Value Date    LABA1C 8.3 (H) 06/17/2019      Complains of numbness in the feet bilat. History reviewed. No pertinent past medical history.     Allergies   Allergen Reactions    Valsartan-Hydrochlorothiazide Other (See Comments)     Current Outpatient Medications on File Prior to Visit   Medication Sig Dispense Refill    acetaminophen (TYLENOL) 500 MG tablet Take 2 tablets by mouth every 8 hours 120 tablet 0    ibuprofen (ADVIL;MOTRIN) 800 MG tablet Take 1 tablet by mouth every 8 hours 30 tablet 0    docusate sodium (COLACE, DULCOLAX) 100 MG CAPS Take 200 mg by mouth 2 times daily 30 capsule 0    lidocaine 4 % external patch Place 2 patches onto the skin daily 1 box 0    tamsulosin (FLOMAX) 0.4 MG capsule Take 1 capsule by mouth daily 30 capsule 0    amLODIPine (NORVASC) 10 MG tablet Take 10 mg by mouth daily      hydrochlorothiazide (HYDRODIURIL) 25 MG tablet Take 25 mg by mouth daily      glimepiride (AMARYL) 4 MG tablet Take 4 mg by mouth 2 times daily      metFORMIN (GLUCOPHAGE) 1000 MG tablet Take 1,000 mg by mouth 2 times daily (with meals)      Insulin NPH Isophane & Regular (NOVOLIN 70/30 SC) Inject 35 Units into the skin every morning Indications: please take 35 units in the morning and 25 units at night      gabapentin (NEURONTIN) 600 MG tablet Take 1 tablet by mouth every 8 hours for 10 days. 30 tablet 0     No current facility-administered medications on file prior to visit. Review of Systems    Review of Systems:   History obtained from chart review and the patient  General ROS: negative for - chills, fatigue, fever, night sweats or weight gain  Constitutional: Negative for chills, diaphoresis, fatigue, fever and unexpected weight change. Musculoskeletal: Positive for arthralgias, gait problem and joint swelling. Neurological ROS: negative for - behavioral changes, confusion, headaches or seizures. Negative for weakness and numbness. Dermatological ROS: negative for - mole changes, rash  Cardiovascular: Negative for leg swelling. Gastrointestinal: Negative for constipation, diarrhea, nausea and vomiting. Objective:  Dermatologic Exam:  Skin lesion/ulceration Absent . Skin No rashes or nodules noted. .   Skin is thin, with flaky sloughing skin as well as decreased hair growth to the lower leg  Small red hemosiderin deposits seen dorsal foot   Musculoskeletal:     1st MPJ ROM decreased, Bilateral.  Muscle strength 5/5, Bilateral.  Pain present upon palpation of toenails 1-5, Bilateral. decreased medial longitudinal arch, Bilateral.  Ankle ROM decreased,Bilateral.    Dorsally contracted digits present digits 2, Bilateral.     Vascular: DP pulses 1/4 bilateral.  PT pulses 0/4 bilateral.   CFT <5 seconds, Bilateral.  Hair growth absent to the level of the digits, Bilateral.  Edema present, Bilateral.  Varicosities absent, Bilateral. Erythema absent, Bilateral    Neurological: Sensation diminshed to light touch to level of digits, Bilateral.  Protective sensation intact 6/10 sites via 5.07/10g [place]-Anand Monofilament, Bilateral.  negative Tinel's, Bilateral.  negative Valleix sign, Bilateral.      Integument: Warm, dry, supple, Bilateral.  Open lesion absent, Bilateral.  Interdigital maceration absent to web spaces 4, Bilateral.  Nails 1-5 left and 1-5 right thickened > 3.0 mm, dystrophic and crumbly, discolored with subungual debris. Fissures absent, Bilateral.   General: AAO x 3 in NAD. Derm  Toenail Description  Sites of Onychomycosis Involvement (Check affected area)  [x] [x] [x] [x] [x] [x] [x] [x] [x] [x]  5 4 3 2 1 1 2 3 4 5                          Right                                        Left    Thickness  [x] [x] [x] [x] [x] [x] [x] [x] [x] [x]  5 4 3 2 1 1 2 3 4 5                         Right                                        Left    Dystrophic Changes   [x] [x] [x] [x] [x] [x] [x] [x] [x] [x]  5 4 3 2 1 1 2 3 4 5                         Right                                        Left    Color   [x] [x] [x] [x] [x] [x] [x] [x] [x] [x]  5 4 3 2 1 1 2 3 4 5                          Right                                        Left    Incurvation/Ingrowin   [] [] [] [] [] [] [] [] [] []  5 4 3 2 1 1 2 3 4 5                         Right                                        Left    Inflammation/Pain   [x] [x] [x] [x] [x] [x] [x] [x] [x] [x]  5 4 3 2 1 1 2 3 4 5                         Right                                        Left        Visual inspection:  Deformity: hammertoe deformity christiano feet  amputation: absent  Skin lesions: absent  Edema: right- 2+ pitting edema, left- 2+ pitting edema    Sensory exam:  Monofilament sensation: abnormal - 6/10 via SW 5.07/10g monofilament to the plantar foot bilateral feet    Pulses: abnormal - 1/4 dorsalis pedis pulse and 0/4 Posterior tibial pulse,   Pinprick: Impaired  Proprioception: Impaired  Vibration (128 Hz):  Impaired       DM with PVD       [x]Yes    []No      Assessment:  70 y.o. male with:   Diagnosis Orders   1. Dermatophytosis of nail  92522 - ID DEBRIDEMENT OF NAILS, 6 OR MORE    HM DIABETES FOOT EXAM   2. Other diabetic neurological complication associated with type 2 diabetes mellitus ([facility] [address]  32335 - ID DEBRIDEMENT OF NAILS, 6 OR MORE    HM DIABETES FOOT EXAM   3. Edema of lower extremity  71204 - ID DEBRIDEMENT OF NAILS, 6 OR MORE    HM DIABETES FOOT EXAM   4. Peripheral vascular disorder (HCC)  87353 - ID DEBRIDEMENT OF NAILS, 6 OR MORE    HM DIABETES FOOT EXAM           Q7   []Yes    []No                Q8   [x]Yes    []No                     Q9   []Yes    []No    Plan:   Pt was evaluated and examined. Patient was given personalized discharge instructions. Nails 1-10 were debrided sharply in length and thickness with a nipper and , without incident. Pt will follow up in 9 weeks or sooner if any problems arise. Diagnosis was discussed with the pt and all of their questions were answered in detail. Proper foot hygiene and care was discussed with the pt. Informed patient on proper diabetic foot care and importance of tight glycemic control. Patient to check feet daily and contact the office with any questions/problems/concerns.    Other comorbidity noted and will be managed by PCP.  1/3/2022    Electronically signed by Pb Flores DPM on 1/3/2022 at 3:05 PM  1/3/2022

## 2024-07-24 NOTE — PLAN OF CARE
Pt remains free of falls at this time. Bed locked in lowest position, siderails x2, call light in reach. Non-skid footwear applied. Encouraged pt to call for assistance as needed for safety. Falling star posted outside of room. Will continue to monitor. [employer] called back. Reiterated message from yesterday saying the [employer] needs a PA since  coupon has already been used and they're only allotted one per lifetime. Looks like someone just resent the [employer] rx with no other information. Insurance will not cover. Please advise.